# Patient Record
Sex: FEMALE | Race: BLACK OR AFRICAN AMERICAN | NOT HISPANIC OR LATINO
[De-identification: names, ages, dates, MRNs, and addresses within clinical notes are randomized per-mention and may not be internally consistent; named-entity substitution may affect disease eponyms.]

---

## 2018-09-25 ENCOUNTER — APPOINTMENT (OUTPATIENT)
Dept: VASCULAR SURGERY | Facility: CLINIC | Age: 83
End: 2018-09-25

## 2019-09-10 ENCOUNTER — APPOINTMENT (OUTPATIENT)
Dept: WOUND CARE | Facility: CLINIC | Age: 84
End: 2019-09-10
Payer: COMMERCIAL

## 2019-09-10 VITALS
BODY MASS INDEX: 36.64 KG/M2 | WEIGHT: 228 LBS | SYSTOLIC BLOOD PRESSURE: 105 MMHG | HEIGHT: 66 IN | DIASTOLIC BLOOD PRESSURE: 78 MMHG | TEMPERATURE: 97.5 F | HEART RATE: 60 BPM

## 2019-09-10 DIAGNOSIS — M54.9 DORSALGIA, UNSPECIFIED: ICD-10-CM

## 2019-09-10 DIAGNOSIS — Z91.81 HISTORY OF FALLING: ICD-10-CM

## 2019-09-10 PROCEDURE — 99204 OFFICE O/P NEW MOD 45 MIN: CPT

## 2019-09-10 RX ORDER — SODIUM HYPOCHLORITE 1.25 MG/ML
0.12 SOLUTION TOPICAL
Qty: 1 | Refills: 3 | Status: ACTIVE | COMMUNITY
Start: 2019-09-10 | End: 1900-01-01

## 2019-09-10 RX ORDER — COLLAGENASE SANTYL 250 [ARB'U]/G
250 OINTMENT TOPICAL DAILY
Qty: 1 | Refills: 3 | Status: ACTIVE | COMMUNITY
Start: 2019-09-10 | End: 1900-01-01

## 2019-09-10 NOTE — REASON FOR VISIT
[Consultation] : a consultation visit [Family Member] : family member [FreeTextEntry1] : sacral ulcer

## 2019-09-10 NOTE — REVIEW OF SYSTEMS
[As Noted in HPI] : as noted in HPI [Skin Wound] : skin wound [Negative] : Neurological [FreeTextEntry3] : drops for glaucoma [FreeTextEntry8] : frequency

## 2019-09-10 NOTE — ASSESSMENT
[FreeTextEntry1] : 86 yr. old with pressure ulcer sacral stage 3, and left ischial stage 2,H/O DM HTN HLD PVD lymphedema, atrial sep anerysm, CKD stage 3, lumbar stenosis, alopecia, candidiasis (not current)\par  pt. ambulates with walker, wears depends undergarments, incont. at times.\par   datacomplexity- moderate, lab, xr, old rec, test resultsreviewed, no image\par risk-mod for surgery, on asa tolerated sharp debridement well, minimal bleeding x 2 wounds\par risk for fall\par Plan  - clean with dakins, apply santyl to moistened dakins gauze, apply to both areas, cover\par Instructed to avoid depends undergarment, wear underwear with sanitary liner instead to avoid urine against skin,\par Nutrition-  counselled to increase protein calories/DM monitor sugar, fup with hga1c\par Offload- will order offloading mattress and roho cushion, instructed in high protein diet.\par Dakins and santyl ordered from pharmacy, supplies ordered from [facility]\par Declined nursing services, daughter is a PA and will do wound care \par

## 2019-09-10 NOTE — PHYSICAL EXAM
[Normal Breath Sounds] : Normal breath sounds [Normal Heart Sounds] : normal heart sounds [Skin Ulcer] : ulcer [Alert] : alert [Oriented to Person] : oriented to person [Oriented to Place] : oriented to place [Oriented to Time] : oriented to time [Calm] : calm [JVD] : no jugular venous distention  [Normal Rate and Rhythm] : normal rate and rhythm [2+] : left 2+ [Ankle Swelling (On Exam)] : not present [Abdomen Tenderness] : ~T ~M No abdominal tenderness [de-identified] : NAD [de-identified] : frequency [de-identified] : uses walker to ambulate [FreeTextEntry1] : buttock [FreeTextEntry2] : 1.9 [FreeTextEntry3] : 1.8 [FreeTextEntry4] : 0.2 [FreeTextEntry5] : # 15 scalpel [de-identified] : skin and subcutaneous 1 mm deeper [de-identified] : dakins/santyl [FreeTextEntry7] : midline sacral [FreeTextEntry8] : 3 [FreeTextEntry9] : 1.6 [de-identified] : 1.2 [FreeTextEntry6] : # 15 scalpel [de-identified] : skin and subcutaneous 1 mm deeper [de-identified] : dakins/santyl [TWNoteComboBox1] : Left [TWNoteComboBox2] : 2 [TWNoteComboBox6] : Pressure [de-identified] : 2.5% Lidocaine Topical [TWNoteComboBox7] : Kristina [de-identified] : Debridement performed of all devitalized tissue to bleeding viable tissue [de-identified] : 3 [de-identified] : Pressure [de-identified] : 2.5% Lidocaine Topical [TWNoteComboBox8] : Kristina [de-identified] : Debridement performed of all devitalized tissue to bleeding viable tissue

## 2019-10-04 ENCOUNTER — APPOINTMENT (OUTPATIENT)
Dept: WOUND CARE | Facility: CLINIC | Age: 84
End: 2019-10-04

## 2019-11-14 ENCOUNTER — APPOINTMENT (OUTPATIENT)
Dept: WOUND CARE | Facility: CLINIC | Age: 84
End: 2019-11-14
Payer: COMMERCIAL

## 2019-11-14 DIAGNOSIS — Z87.39 PERSONAL HISTORY OF OTHER DISEASES OF THE MUSCULOSKELETAL SYSTEM AND CONNECTIVE TISSUE: ICD-10-CM

## 2019-11-14 PROCEDURE — 11042 DBRDMT SUBQ TIS 1ST 20SQCM/<: CPT

## 2019-11-14 NOTE — ASSESSMENT
[FreeTextEntry1] : 86 yr. old with pressure ulcer sacral stage 3, and left ischial stage 2,H/O DM HTN HLD PVD lymphedema, atrial sep anerysm, CKD stage 3, lumbar stenosis, alopecia, candidiasis (not current)\par  pt. ambulates with walker, wears depends undergarments, incont. at times.\par   datacomplexity- moderate, lab, xr, old rec, test resultsreviewed, no image\par risk-mod for surgery, on asa tolerated sharp debridement well, minimal bleeding x 2 wounds\par risk for fall\par will order vac 125\par on mvi- reviewed nutrition\par Plan  - clean with dakins, apply santyl to moistened dakins gauze, apply to both areas, cover\par Instructed to avoid depends undergarment, wear underwear with sanitary liner instead to avoid urine against skin,\par Nutrition-  counselled to increase protein calories/DM monitor sugar, fup with hga1c\par Offload- will order offloading mattress and roho cushion, instructed in high protein diet.\par Dakins and santyl ordered from pharmacy, supplies ordered from [facility]\par Declined nursing services, daughter is a PA and will do wound care \par \par Wound measurement has larger and vac is suggested three times \par

## 2019-11-14 NOTE — PHYSICAL EXAM
[JVD] : no jugular venous distention  [Normal Breath Sounds] : Normal breath sounds [Normal Heart Sounds] : normal heart sounds [Normal Rate and Rhythm] : normal rate and rhythm [2+] : left 2+ [Ankle Swelling (On Exam)] : not present [Abdomen Tenderness] : ~T ~M No abdominal tenderness [Skin Ulcer] : ulcer [Alert] : alert [Oriented to Person] : oriented to person [Oriented to Place] : oriented to place [Oriented to Time] : oriented to time [Calm] : calm [de-identified] : NAD [de-identified] : frequency [de-identified] : uses walker to ambulate [FreeTextEntry1] : buttock [FreeTextEntry2] : .1 [FreeTextEntry3] : .1 [FreeTextEntry4] : 0.2 [de-identified] : skin and subcutaneous 1 mm deeper [FreeTextEntry5] : # 15 scalpel [de-identified] : woo [de-identified] : 1.9/1.8/0.2 [FreeTextEntry7] : midline sacral [FreeTextEntry8] : 4.2cm [FreeTextEntry9] : 2cm [de-identified] : 1.6 [FreeTextEntry6] : # 3 hilaryette [de-identified] : skin and subcutaneous 1 mm deeper [de-identified] : off loading [de-identified] : dakins/santyl [de-identified] : previous3/1.6/1.2 [TWNoteComboBox1] : Left [TWNoteComboBox6] : Pressure [TWNoteComboBox2] : 2 [TWNoteComboBox7] : Kristina [de-identified] : Debridement performed of all devitalized tissue to bleeding viable tissue [de-identified] : 3 [de-identified] : Small [de-identified] : No [de-identified] : Pressure [de-identified] : No [de-identified] : Macerated [de-identified] : None [de-identified] : None [de-identified] : 100% [TWNoteComboBox8] : Kristina [de-identified] : Yes [de-identified] : Debridement performed of all devitalized tissue to bleeding viable tissue [de-identified] : Other [de-identified] : Primary Dressing

## 2019-11-14 NOTE — HISTORY OF PRESENT ILLNESS
[FreeTextEntry1] : Ms. CARMELLA NAILS   presents to the office with a wound for few months duration.  The wound is located on  the sacral  and buttock .  The patient has complaints of burning,pain, \par    The patient has been dressing the wound with silvadene. \par  The patient denies fevers or chills.  \par The patient has localized pain to the wound upon dressing changes. \par  The patient has no other complaints or associated symptoms.  HbA1c is 6.5\par H/O DM HTN HLD PVD lymphedema, atrial sep anerysm, CKD stage 3, lumbar stenosis, alopecia, candidiasis\par

## 2019-12-12 ENCOUNTER — APPOINTMENT (OUTPATIENT)
Dept: WOUND CARE | Facility: CLINIC | Age: 84
End: 2019-12-12
Payer: COMMERCIAL

## 2019-12-12 DIAGNOSIS — Z86.79 PERSONAL HISTORY OF OTHER DISEASES OF THE CIRCULATORY SYSTEM: ICD-10-CM

## 2019-12-12 PROCEDURE — 11043 DBRDMT MUSC&/FSCA 1ST 20/<: CPT

## 2019-12-12 PROCEDURE — 99213 OFFICE O/P EST LOW 20 MIN: CPT | Mod: 25

## 2019-12-12 NOTE — PHYSICAL EXAM
[Normal Breath Sounds] : Normal breath sounds [Normal Rate and Rhythm] : normal rate and rhythm [Normal Heart Sounds] : normal heart sounds [2+] : left 2+ [Skin Ulcer] : ulcer [Alert] : alert [Oriented to Person] : oriented to person [Oriented to Place] : oriented to place [Oriented to Time] : oriented to time [Calm] : calm [4 x 4] : 4 x 4  [JVD] : no jugular venous distention  [Abdomen Tenderness] : ~T ~M No abdominal tenderness [Ankle Swelling (On Exam)] : not present [de-identified] : NAD [de-identified] : frequency [de-identified] : uses walker to ambulate [FreeTextEntry1] : buttock [FreeTextEntry2] : .1 [FreeTextEntry3] : .1 [FreeTextEntry4] : 0.2 [FreeTextEntry5] : # 15 scalpel [de-identified] : skin and subcutaneous 1 mm deeper [de-identified] : woo [de-identified] : 1.9/1.8/0.2 [FreeTextEntry7] : midline sacral [FreeTextEntry8] : 4 cm [FreeTextEntry9] : 2.2 cm [de-identified] : @ 6 oclock 1.5cm  [de-identified] : 1cm [FreeTextEntry6] : # 3 hilaryette [de-identified] : skin and subcutaneous 1 mm deeper [de-identified] : off loading [de-identified] : dakins/santyl [de-identified] : previous\par 4.2/2/1.6 [TWNoteComboBox1] : Left [TWNoteComboBox2] : 2 [TWNoteComboBox7] : Kristina [TWNoteComboBox6] : Pressure [de-identified] : Debridement performed of all devitalized tissue to bleeding viable tissue [de-identified] : 3 [de-identified] : Small [de-identified] : No [de-identified] : Pressure [de-identified] : No [de-identified] : None [de-identified] : Macerated [de-identified] : None [de-identified] : Yes [de-identified] : 100% [TWNoteComboBox8] : Kristina [de-identified] : Other [de-identified] : Debridement performed of all devitalized tissue to bleeding viable tissue

## 2019-12-12 NOTE — ASSESSMENT
[FreeTextEntry1] : 86 yr. old with pressure ulcer sacral stage 3, and left ischial stage 2,H/O DM HTN HLD PVD lymphedema, atrial sep anerysm, CKD stage 3, lumbar stenosis, alopecia, candidiasis (not current)\par \par trying to move more, pt. ambulates with walker, wears depends undergarments, incont. at times.\par   datacomplexity- moderate, lab, xr, old rec, test resultsreviewed, no image\par risk-mod for surgery, on asa tolerated sharp debridement well, minimal bleeding  \par risk for fall\par refusing vac 125- dc, options reviewed patient and family\par on mvi- reviewed nutrition\par Plan  - clean with dakins, apply santyl to moistened dakins gauze, apply to both areas, cover\par Instructed to avoid depends undergarment, wear underwear with sanitary liner instead to avoid urine against skin,\par Nutrition-  counselled to increase protein calories/DM monitor sugar, fup with hga1c\par \par Offload- will order offloading mattress and roho cushion, instructed in high protein diet.\par Dakins and santyl ordered from pharmacy, supplies ordered from [facility]\par Declined nursing services, daughter is a PA and will do wound care \par \par  discussed prontosan option

## 2019-12-12 NOTE — HISTORY OF PRESENT ILLNESS
[FreeTextEntry1] : Ms. CARMELLA NAILS   presents to the office with a wound for few months duration. \par had vac started last  month- refuses to use, will not change to a lower pressure, reviewed with patient and family , currently using dakins and foam with santyl\par \par  The wound is located on  the sacral  and buttock .  The patient has complaints of burning,pain, \par    The patient has been dressing the wound with silvadene. \par  The patient denies fevers or chills.  \par The patient has localized pain to the wound upon dressing changes. \par  The patient has no other complaints or associated symptoms.  HbA1c is 6.5\par H/O DM HTN HLD PVD lymphedema, atrial sep anerysm, CKD stage 3, lumbar stenosis, alopecia, candidiasis\par

## 2020-01-16 ENCOUNTER — APPOINTMENT (OUTPATIENT)
Dept: WOUND CARE | Facility: CLINIC | Age: 85
End: 2020-01-16
Payer: COMMERCIAL

## 2020-01-16 PROCEDURE — 99213 OFFICE O/P EST LOW 20 MIN: CPT | Mod: 25

## 2020-01-16 PROCEDURE — 11042 DBRDMT SUBQ TIS 1ST 20SQCM/<: CPT

## 2020-02-06 ENCOUNTER — INPATIENT (INPATIENT)
Facility: HOSPITAL | Age: 85
LOS: 0 days | Discharge: HOME | End: 2020-02-07
Attending: HOSPITALIST | Admitting: HOSPITALIST
Payer: COMMERCIAL

## 2020-02-06 VITALS
TEMPERATURE: 98 F | RESPIRATION RATE: 16 BRPM | SYSTOLIC BLOOD PRESSURE: 137 MMHG | HEART RATE: 62 BPM | DIASTOLIC BLOOD PRESSURE: 32 MMHG | OXYGEN SATURATION: 100 %

## 2020-02-06 DIAGNOSIS — K92.1 MELENA: ICD-10-CM

## 2020-02-06 DIAGNOSIS — Z79.899 OTHER LONG TERM (CURRENT) DRUG THERAPY: ICD-10-CM

## 2020-02-06 DIAGNOSIS — E78.5 HYPERLIPIDEMIA, UNSPECIFIED: ICD-10-CM

## 2020-02-06 DIAGNOSIS — I10 ESSENTIAL (PRIMARY) HYPERTENSION: ICD-10-CM

## 2020-02-06 DIAGNOSIS — K92.2 GASTROINTESTINAL HEMORRHAGE, UNSPECIFIED: ICD-10-CM

## 2020-02-06 DIAGNOSIS — E11.9 TYPE 2 DIABETES MELLITUS WITHOUT COMPLICATIONS: ICD-10-CM

## 2020-02-06 DIAGNOSIS — Z02.9 ENCOUNTER FOR ADMINISTRATIVE EXAMINATIONS, UNSPECIFIED: ICD-10-CM

## 2020-02-06 DIAGNOSIS — F03.90 UNSPECIFIED DEMENTIA WITHOUT BEHAVIORAL DISTURBANCE: ICD-10-CM

## 2020-02-06 DIAGNOSIS — D50.0 IRON DEFICIENCY ANEMIA SECONDARY TO BLOOD LOSS (CHRONIC): ICD-10-CM

## 2020-02-06 DIAGNOSIS — L98.429 NON-PRESSURE CHRONIC ULCER OF BACK WITH UNSPECIFIED SEVERITY: ICD-10-CM

## 2020-02-06 DIAGNOSIS — Z29.9 ENCOUNTER FOR PROPHYLACTIC MEASURES, UNSPECIFIED: ICD-10-CM

## 2020-02-06 LAB
ALBUMIN SERPL ELPH-MCNC: 3.7 G/DL — SIGNIFICANT CHANGE UP (ref 3.3–5)
ALP SERPL-CCNC: 44 U/L — SIGNIFICANT CHANGE UP (ref 40–120)
ALT FLD-CCNC: 19 U/L — SIGNIFICANT CHANGE UP (ref 4–33)
ANION GAP SERPL CALC-SCNC: 11 MMO/L — SIGNIFICANT CHANGE UP (ref 7–14)
APTT BLD: 29.5 SEC — SIGNIFICANT CHANGE UP (ref 27.5–36.3)
AST SERPL-CCNC: 43 U/L — HIGH (ref 4–32)
BASOPHILS # BLD AUTO: 0.1 K/UL — SIGNIFICANT CHANGE UP (ref 0–0.2)
BASOPHILS NFR BLD AUTO: 1.4 % — SIGNIFICANT CHANGE UP (ref 0–2)
BILIRUB SERPL-MCNC: 0.5 MG/DL — SIGNIFICANT CHANGE UP (ref 0.2–1.2)
BLD GP AB SCN SERPL QL: NEGATIVE — SIGNIFICANT CHANGE UP
BUN SERPL-MCNC: 25 MG/DL — HIGH (ref 7–23)
CALCIUM SERPL-MCNC: 10.1 MG/DL — SIGNIFICANT CHANGE UP (ref 8.4–10.5)
CHLORIDE SERPL-SCNC: 105 MMOL/L — SIGNIFICANT CHANGE UP (ref 98–107)
CO2 SERPL-SCNC: 24 MMOL/L — SIGNIFICANT CHANGE UP (ref 22–31)
CREAT SERPL-MCNC: 0.92 MG/DL — SIGNIFICANT CHANGE UP (ref 0.5–1.3)
EOSINOPHIL # BLD AUTO: 0.16 K/UL — SIGNIFICANT CHANGE UP (ref 0–0.5)
EOSINOPHIL NFR BLD AUTO: 2.2 % — SIGNIFICANT CHANGE UP (ref 0–6)
GLUCOSE SERPL-MCNC: 138 MG/DL — HIGH (ref 70–99)
HCT VFR BLD CALC: 32.4 % — LOW (ref 34.5–45)
HCT VFR BLD CALC: 34.4 % — LOW (ref 34.5–45)
HGB BLD-MCNC: 10.1 G/DL — LOW (ref 11.5–15.5)
HGB BLD-MCNC: 11 G/DL — LOW (ref 11.5–15.5)
IMM GRANULOCYTES NFR BLD AUTO: 0.4 % — SIGNIFICANT CHANGE UP (ref 0–1.5)
INR BLD: 1.03 — SIGNIFICANT CHANGE UP (ref 0.88–1.17)
LYMPHOCYTES # BLD AUTO: 1.81 K/UL — SIGNIFICANT CHANGE UP (ref 1–3.3)
LYMPHOCYTES # BLD AUTO: 24.5 % — SIGNIFICANT CHANGE UP (ref 13–44)
MAGNESIUM SERPL-MCNC: 1.4 MG/DL — LOW (ref 1.6–2.6)
MCHC RBC-ENTMCNC: 31.2 % — LOW (ref 32–36)
MCHC RBC-ENTMCNC: 32 % — SIGNIFICANT CHANGE UP (ref 32–36)
MCHC RBC-ENTMCNC: 32.1 PG — SIGNIFICANT CHANGE UP (ref 27–34)
MCHC RBC-ENTMCNC: 32.6 PG — SIGNIFICANT CHANGE UP (ref 27–34)
MCV RBC AUTO: 102.1 FL — HIGH (ref 80–100)
MCV RBC AUTO: 102.9 FL — HIGH (ref 80–100)
MONOCYTES # BLD AUTO: 0.49 K/UL — SIGNIFICANT CHANGE UP (ref 0–0.9)
MONOCYTES NFR BLD AUTO: 6.6 % — SIGNIFICANT CHANGE UP (ref 2–14)
NEUTROPHILS # BLD AUTO: 4.8 K/UL — SIGNIFICANT CHANGE UP (ref 1.8–7.4)
NEUTROPHILS NFR BLD AUTO: 64.9 % — SIGNIFICANT CHANGE UP (ref 43–77)
NRBC # FLD: 0 K/UL — SIGNIFICANT CHANGE UP (ref 0–0)
NRBC # FLD: 0 K/UL — SIGNIFICANT CHANGE UP (ref 0–0)
OB PNL STL: POSITIVE — SIGNIFICANT CHANGE UP
PHOSPHATE SERPL-MCNC: 3 MG/DL — SIGNIFICANT CHANGE UP (ref 2.5–4.5)
PLATELET # BLD AUTO: 277 K/UL — SIGNIFICANT CHANGE UP (ref 150–400)
PLATELET # BLD AUTO: 282 K/UL — SIGNIFICANT CHANGE UP (ref 150–400)
PMV BLD: 11.1 FL — SIGNIFICANT CHANGE UP (ref 7–13)
PMV BLD: 11.2 FL — SIGNIFICANT CHANGE UP (ref 7–13)
POTASSIUM SERPL-MCNC: 5.3 MMOL/L — SIGNIFICANT CHANGE UP (ref 3.5–5.3)
POTASSIUM SERPL-SCNC: 5.3 MMOL/L — SIGNIFICANT CHANGE UP (ref 3.5–5.3)
PROT SERPL-MCNC: 7.1 G/DL — SIGNIFICANT CHANGE UP (ref 6–8.3)
PROTHROM AB SERPL-ACNC: 11.5 SEC — SIGNIFICANT CHANGE UP (ref 9.8–13.1)
RBC # BLD: 3.15 M/UL — LOW (ref 3.8–5.2)
RBC # BLD: 3.37 M/UL — LOW (ref 3.8–5.2)
RBC # FLD: 11.4 % — SIGNIFICANT CHANGE UP (ref 10.3–14.5)
RBC # FLD: 11.6 % — SIGNIFICANT CHANGE UP (ref 10.3–14.5)
RH IG SCN BLD-IMP: POSITIVE — SIGNIFICANT CHANGE UP
SODIUM SERPL-SCNC: 140 MMOL/L — SIGNIFICANT CHANGE UP (ref 135–145)
WBC # BLD: 7.39 K/UL — SIGNIFICANT CHANGE UP (ref 3.8–10.5)
WBC # BLD: 8.24 K/UL — SIGNIFICANT CHANGE UP (ref 3.8–10.5)
WBC # FLD AUTO: 7.39 K/UL — SIGNIFICANT CHANGE UP (ref 3.8–10.5)
WBC # FLD AUTO: 8.24 K/UL — SIGNIFICANT CHANGE UP (ref 3.8–10.5)

## 2020-02-06 PROCEDURE — 99222 1ST HOSP IP/OBS MODERATE 55: CPT

## 2020-02-06 RX ORDER — INFLUENZA VIRUS VACCINE 15; 15; 15; 15 UG/.5ML; UG/.5ML; UG/.5ML; UG/.5ML
0.5 SUSPENSION INTRAMUSCULAR ONCE
Refills: 0 | Status: DISCONTINUED | OUTPATIENT
Start: 2020-02-06 | End: 2020-02-07

## 2020-02-06 RX ORDER — INSULIN LISPRO 100/ML
VIAL (ML) SUBCUTANEOUS
Refills: 0 | Status: DISCONTINUED | OUTPATIENT
Start: 2020-02-06 | End: 2020-02-07

## 2020-02-06 RX ORDER — INSULIN LISPRO 100/ML
VIAL (ML) SUBCUTANEOUS AT BEDTIME
Refills: 0 | Status: DISCONTINUED | OUTPATIENT
Start: 2020-02-06 | End: 2020-02-07

## 2020-02-06 RX ORDER — MAGNESIUM SULFATE 500 MG/ML
1 VIAL (ML) INJECTION ONCE
Refills: 0 | Status: COMPLETED | OUTPATIENT
Start: 2020-02-06 | End: 2020-02-06

## 2020-02-06 RX ORDER — DEXTROSE 50 % IN WATER 50 %
25 SYRINGE (ML) INTRAVENOUS ONCE
Refills: 0 | Status: DISCONTINUED | OUTPATIENT
Start: 2020-02-06 | End: 2020-02-07

## 2020-02-06 RX ORDER — GLUCAGON INJECTION, SOLUTION 0.5 MG/.1ML
1 INJECTION, SOLUTION SUBCUTANEOUS ONCE
Refills: 0 | Status: DISCONTINUED | OUTPATIENT
Start: 2020-02-06 | End: 2020-02-07

## 2020-02-06 RX ORDER — ATENOLOL 25 MG/1
1 TABLET ORAL
Qty: 0 | Refills: 0 | DISCHARGE

## 2020-02-06 RX ORDER — ATENOLOL 25 MG/1
25 TABLET ORAL DAILY
Refills: 0 | Status: DISCONTINUED | OUTPATIENT
Start: 2020-02-06 | End: 2020-02-06

## 2020-02-06 RX ORDER — LATANOPROST 0.05 MG/ML
1 SOLUTION/ DROPS OPHTHALMIC; TOPICAL AT BEDTIME
Refills: 0 | Status: DISCONTINUED | OUTPATIENT
Start: 2020-02-06 | End: 2020-02-07

## 2020-02-06 RX ORDER — SIMVASTATIN 20 MG/1
20 TABLET, FILM COATED ORAL AT BEDTIME
Refills: 0 | Status: DISCONTINUED | OUTPATIENT
Start: 2020-02-06 | End: 2020-02-07

## 2020-02-06 RX ORDER — ATENOLOL 25 MG/1
25 TABLET ORAL DAILY
Refills: 0 | Status: DISCONTINUED | OUTPATIENT
Start: 2020-02-06 | End: 2020-02-07

## 2020-02-06 RX ORDER — DEXTROSE 50 % IN WATER 50 %
12.5 SYRINGE (ML) INTRAVENOUS ONCE
Refills: 0 | Status: DISCONTINUED | OUTPATIENT
Start: 2020-02-06 | End: 2020-02-07

## 2020-02-06 RX ORDER — PANTOPRAZOLE SODIUM 20 MG/1
40 TABLET, DELAYED RELEASE ORAL EVERY 12 HOURS
Refills: 0 | Status: DISCONTINUED | OUTPATIENT
Start: 2020-02-06 | End: 2020-02-07

## 2020-02-06 RX ORDER — LANOLIN ALCOHOL/MO/W.PET/CERES
3 CREAM (GRAM) TOPICAL AT BEDTIME
Refills: 0 | Status: DISCONTINUED | OUTPATIENT
Start: 2020-02-06 | End: 2020-02-07

## 2020-02-06 RX ORDER — DEXTROSE 50 % IN WATER 50 %
15 SYRINGE (ML) INTRAVENOUS ONCE
Refills: 0 | Status: DISCONTINUED | OUTPATIENT
Start: 2020-02-06 | End: 2020-02-07

## 2020-02-06 RX ORDER — SODIUM CHLORIDE 9 MG/ML
1000 INJECTION, SOLUTION INTRAVENOUS
Refills: 0 | Status: DISCONTINUED | OUTPATIENT
Start: 2020-02-06 | End: 2020-02-07

## 2020-02-06 RX ADMIN — Medication 100 GRAM(S): at 23:01

## 2020-02-06 NOTE — H&P ADULT - PROBLEM SELECTOR PLAN 1
- ddx includes diverticulosis, hemorrhoids, polyp vs. malignancy, angioectasia  - hgb currently stable 10s-11s at baseline  - maintain active T&S although patient is Jehova's witness  - CBCs q12 for now given patient HD stable and cannot receive blood transfusion  - GI c/s in AM  - hold ASA and DVT PPx in setting of acute LGIB - ddx includes diverticulosis, hemorrhoids, polyp vs. malignancy, angioectasia  - hgb currently stable 10s-11s at baseline  - maintain active T&S although patient is Jehova's witness  - CBCs q12 for now given patient HD stable and cannot receive blood transfusion  - GI c/s in AM  - hold ASA and DVT PPx in setting of acute LGIB  - PPI IV q12 for now - ddx includes diverticulosis, hemorrhoids, polyp vs. malignancy, angioectasia  - hgb currently stable 10s-11s at baseline  - maintain active T&S, although patient is [employer] and likely not a candidate for blood products  - monitor CBC q12hrs for now given patient HD stable and cannot receive blood transfusion  - GI c/s in AM  - hold ASA and DVT PPx in setting of acute LGIB  - PPI IV q12hrs for now

## 2020-02-06 NOTE — H&P ADULT - PROBLEM SELECTOR PLAN 9
IMPROVE >2 however patient with active GIB  - DVT: SCDs  - Diet: DASH/TLC with CC, will give clear liquids for now  - Dispo: Pending further workup of GIB IMPROVE >2, however patient with active GIB  - DVT: SCDs  - Diet: DASH/TLC with CC, will give clear liquids for now  - Dispo: Pending further workup of GIB

## 2020-02-06 NOTE — ED PROVIDER NOTE - ATTENDING CONTRIBUTION TO CARE
Dr. Culver: 88 yo female with HTN, DM, HLD, in ED with concern for bleeding from rectum vs. from sacral decub that she sees wound care for.  No abdominal pain.  On exam pt chronically-ill appearing but in NAD, heart RRR, lungs CTAB, abd NTND, extremities without swelling, strength grossly decreased in all extremities, +sacral decubs noted but draining serosanguinous fluid, no signs of infection.

## 2020-02-06 NOTE — ED PROVIDER NOTE - PROGRESS NOTE DETAILS
Maxx: Repeat cbc dropped hb 11 to 10.1.  Discussed possibility of following up outpatient but given drop, will plan to admit. Maxx: Discussed admission with granddaughter Juli over the phone.  Patient wanted to discuss with granddaughter and became agitated after hearing about staying in hospital.  Patient appears to be altered and cannot be redirected.  Granddaughter en route.

## 2020-02-06 NOTE — H&P ADULT - PROBLEM SELECTOR PLAN 3
- FS premeal and qHS  - AISS PRN  - last a1c 5.4 2016 - hgb 10s-11s at baseline  - maintain active T&S although patient is a Jehovah's witness - hgb 10s-11s at baseline  - maintain active T&S although patient is a Latter day  - macrocytic anemia will check B12 and folate in AM  - iron + TIBC + ferritin in AM - hgb 10s-11s at baseline  - maintain active T&S, although patient is Anglican and likely not a candidate for blood products  - macrocytic anemia will check B12 and folate in AM  - iron + TIBC + ferritin in AM for possible iron infusion instead

## 2020-02-06 NOTE — H&P ADULT - NSHPPHYSICALEXAM_GEN_ALL_CORE
PHYSICAL EXAM:    Vital Signs Last 24 Hrs  T(C): 37.2 (06 Feb 2020 22:26), Max: 37.2 (06 Feb 2020 22:26)  T(F): 98.9 (06 Feb 2020 22:26), Max: 98.9 (06 Feb 2020 22:26)  HR: 65 (06 Feb 2020 22:26) (58 - 65)  BP: 147/55 (06 Feb 2020 22:26) (137/32 - 147/55)  BP(mean): --  RR: 18 (06 Feb 2020 22:26) (12 - 18)  SpO2: 100% (06 Feb 2020 22:26) (100% - 100%)    General: No acute distress.  HEENT: NCAT.  PERRL.  EOMI.  No scleral icterus or injection.  Moist MM.  No oropharyngeal exudates.    Neck: Supple.  Full ROM.  No JVD.  No thyromegaly. No lymphadenopathy.   Heart: RRR.  Normal S1 and S2.  No murmurs, rubs, or gallops.   Lungs: CTAB. No wheezes, crackles, or rhonchi.    Abdomen: BS+, soft, NT/ND.  No organomegaly.  Skin: Stage III sacral ulcer with clean edges, pink tissue with some red tissue but no active bleeding  Extremities: venous stasis changes bilaterally  Musculoskeletal: No deformities.  No spinal or paraspinal tenderness.  Neuro: A&Ox0.  Patient says "here we go again" or "I don't know" to all examiner's questions  Rectal: hard, brown stool in rectal vault, no blood PHYSICAL EXAM:    Vital Signs Last 24 Hrs  T(C): 37.2 (06 Feb 2020 22:26), Max: 37.2 (06 Feb 2020 22:26)  T(F): 98.9 (06 Feb 2020 22:26), Max: 98.9 (06 Feb 2020 22:26)  HR: 65 (06 Feb 2020 22:26) (58 - 65)  BP: 147/55 (06 Feb 2020 22:26) (137/32 - 147/55)  BP(mean): --  RR: 18 (06 Feb 2020 22:26) (12 - 18)  SpO2: 100% (06 Feb 2020 22:26) (100% - 100%)    General: Awake and alert. No acute distress.  Head: NCAT.    Eyes: PERRL.  EOMI.  No scleral icterus or injection.    Mouth: Moist MM.  No oropharyngeal exudates.    Neck: Supple.  Full ROM.  No JVD.  No thyromegaly. No lymphadenopathy.   Heart: RRR.  Normal S1 and S2.  No murmurs, rubs, or gallops.  No pitting LE edema b/l.  Lungs: Nonlabored breathing.  CTAB.  No wheezes, crackles, or rhonchi.    Abdomen: BS+, soft, NT/ND.    Skin: Stage III sacral ulcer with clean edges, pink tissue with some red tissue but no active bleeding  Extremities: 2+ peripheral pulses b/l. Venous stasis changes in LE b/l.  Musculoskeletal: No deformities.  No spinal or paraspinal tenderness.  Neuro: A&Ox0.  Patient says "here we go again" or "I don't know" to all examiner's questions.  Rectal: Hard, brown stool in rectal vault, no blood

## 2020-02-06 NOTE — ED ADULT NURSE REASSESSMENT NOTE - NS ED NURSE REASSESS COMMENT FT1
Pt refusing VS and all care. Granddaughter is not at bedside. Pt states is A&Ox1, states "I've figured you guys out, you can't do anything to me without my granddaughter here." Dr. Smiley at bedside and aware.

## 2020-02-06 NOTE — H&P ADULT - PROBLEM SELECTOR PLAN 10
- Dispo: Pending further workup of GIB  - PCP is Dr. Biswas  - Per granddaughter patient is currently FULL CODE

## 2020-02-06 NOTE — ED ADULT NURSE REASSESSMENT NOTE - NS ED NURSE REASSESS COMMENT FT1
Pt refusing to allow staff to take her VS. Pt states "Leave my alone, I'm not going to die here." Pt calling for her granddaughter, "Juli, Juli they're not going to trick me into staying." Maxx DO called pt's granddaughter to let her know about the situation. Pt is laying down in bed.

## 2020-02-06 NOTE — ED PROVIDER NOTE - OBJECTIVE STATEMENT
88yo female with pmh dm, htn, hld presenting with rectal bleeding.  Patient has noticed 3 bright red bloody bowel movements over the past week.  States she is unsure if she is bleeding from rectum or known stage 3 decubitus ulcer she follows with wound care for.  Denies lightheadedness, syncope, cp, sob.  No abdominal pain.  No blood thinners. 86yo female with pmh dm, htn, hld presenting with rectal bleeding.  Patient has noticed 3 bright red bloody bowel movements over the past week.  States she is unsure if she is bleeding from rectum or known stage 3 decubitus ulcer she follows with wound care for.  Denies lightheadedness, syncope, cp, sob.  No abdominal pain.  No blood thinners.  Patient is a Amish and refuses blood transfusions

## 2020-02-06 NOTE — H&P ADULT - NSICDXPASTSURGICALHX_GEN_ALL_CORE_FT
PAST SURGICAL HISTORY:  H/O benign breast biopsy 1993    Humeral shaft fracture ORIF with hardware  ORIF revision    No significant past surgical history

## 2020-02-06 NOTE — H&P ADULT - ATTENDING COMMENTS
86 yo woman, Buddhism, with history of dementia, DM2, HTN, HLD, LE venous insufficiency, chronic sacral ulcer p/w possible BRBPR. Pt currently with H/H at baseline and HD stable. Will monitor H/H q12hrs and obtain GI consult.

## 2020-02-06 NOTE — H&P ADULT - PROBLEM SELECTOR PLAN 8
- Dispo: Pending further workup of GIB  - PCP is Dr. Biswas - patient and granddaughter cannot recall exact meds  - full med rec in AM

## 2020-02-06 NOTE — H&P ADULT - PROBLEM SELECTOR PLAN 2
- hgb 10s-11s at baseline  - maintain active T&S although patient is a Buddhism - currently appears clean with no active bleeding  - wound care c/s in AM

## 2020-02-06 NOTE — H&P ADULT - HISTORY OF PRESENT ILLNESS
88yo female with pmh dm, htn, hld presenting with rectal bleeding.  Patient has noticed 3 bright red bloody bowel movements over the past week.  States she is unsure if she is bleeding from rectum or known stage 3 decubitus ulcer she follows with wound care for.  Denies lightheadedness, syncope, cp, sob. No abdominal pain.     Patient is a Faith and refuses blood transfusions.    In the ED:  - vital signs Tm 98.6, HR 58-62, -146/32-65, RR 12-16, O2 100  - Labs: hgb 11 (BL 9-12, .1), PLT 280s, BUN 25, Cr. 0.92, BG 130s 88yo female Anglican with pmh dm, htn, hld, PVD, chronic sacral ulcer presenting with BRBPR x 1 week. Patient has noticed 3 bright red bloody bowel movements over the past week.  States she is unsure if she is bleeding from rectum or known stage 3 decubitus ulcer she follows with wound care for.  Denies lightheadedness, syncope, cp, sob. No abdominal pain.     Patient is a Jew and refuses blood transfusions.    In the ED:  - vital signs Tm 98.6, HR 58-62, -146/32-65, RR 12-16, O2 100  - Labs: hgb 11 (BL 9-12, .1), PLT 280s, BUN 25, Cr. 0.92, BG 130s 86yo female Taoism with pmh dementia, dm, htn, hld, LE venous insufficiency, chronic sacral ulcer presenting with intermittent bright red blood found in her sheets x 1 week. Patient is a poor historian so history obtained from patient and patient's granddaughter and HCP Juli [phone]. The patient's granddaughter noticed 3 episodes of bright red blood on her sheets approximately 5cm in diameter. THe patient's granddaughter is unsure whether the blood came from the patient's rectum or from her chronic Stage III sacral ulcer. The patient reports noticing BRBPR in stools, filling the toilet bowl, intermittently for the past week. She denies abdominal pain, N/V. She received a sigmoidoscopy many years ago; she reports the results were WNL. She never received a colonoscopy or endoscopy. For her sacral wound, she follows with Dr. Palomares who reports the sacral ulcer has been improving over time. The patient and daughter deny fever, n/v, lightheadedness, abdominal pain,syncope, cp, sob. She has chronic LE venous stasis changes.      In the ED:  - vital signs Tm 98.6, HR 58-62, -146/32-65, RR 12-16, O2 100  - Labs: hgb 11 (BL 9-12, .1), PLT 280s, BUN 25, Cr. 0.92, BG 130s, FS 83, FOBT +

## 2020-02-06 NOTE — ED PROVIDER NOTE - PHYSICAL EXAMINATION
General appearance: NAD, conversant, afebrile    Neck: Trachea midline; Full range of motion, supple   Pulm: normal respiratory effort and no intercostal retractions, normal work of breathing   Abdomen: Soft, non-tender, non-distended; no masses or hepatosplenomegaly.   Extremities: No peripheral edema    Skin: Dry, normal temperature, turgor and texture; no rash   Psych: Appropriate affect, cooperative   Rectal: No gross blood, external or internal hemorrhoids, no lesions.  Clean stage 3 sacral decubitus ulcer. Chaperone Portillo Mathis MD

## 2020-02-06 NOTE — H&P ADULT - NSICDXPASTMEDICALHX_GEN_ALL_CORE_FT
PAST MEDICAL HISTORY:  Compound fracture right Humerus    DM (diabetes mellitus)     Edema     Essential hypertension     Glaucoma     HTN (hypertension)     Hyperlipidemia     Hyperlipidemia, unspecified hyperlipidemia type     Left bundle branch block     Neoplasm of maxilla     Obesity     PVD (peripheral vascular disease)     Sciatica     Spinal stenosis     Spinal stenosis of lumbar region     Venous stasis of lower extremity

## 2020-02-06 NOTE — ED ADULT NURSE NOTE - OBJECTIVE STATEMENT
Pt received to room 6. Pt comes to ED c/o rectal bleeding for "a few days." Pt reports seeing "bright red blood" in her toilet and on the floor when it "drips after using the bathroom." No hx of hemorrhoids, no anti-coagulation use, endorses taking Aspirin daily. Pt has hx of spinal stenosis, is wheelchair bound. Pt has stage 3 pressure ulcer 3.5x5cm. As per granddaughter at bedside, gets wound care tx. Pt's respirations are even & unlabored, denies chest pain, dyspnea, N/V/D, chills, fever, weakness, dizziness, headache, palpitation, cough. 22 G IV placed to right forearm. Pt is A&Ox4. Pt provided with pillows and blankets. Pt received to room 6. Pt comes to ED c/o rectal bleeding for "a few days." Pt reports seeing "bright red blood" in her toilet and on the floor when it "drips after using the bathroom." No hx of hemorrhoids, no anti-coagulation use, endorses taking Aspirin daily. Pt has hx of spinal stenosis, is wheelchair bound. Pt has stage 3 pressure ulcer 3.5x5cm to right buttock. As per granddaughter at bedside, gets wound care tx. Pt's respirations are even & unlabored, denies chest pain, dyspnea, N/V/D, chills, fever, weakness, dizziness, headache, palpitation, cough. 22 G IV placed to right forearm. Pt is A&Ox4. Pt provided with pillows and blankets.

## 2020-02-06 NOTE — ED PROVIDER NOTE - CLINICAL SUMMARY MEDICAL DECISION MAKING FREE TEXT BOX
88yo female with pmh dm, htn, hld presenting with rectal bleeding.  Will check labs, type.  No gross blood on exam and no identifiable source of bleed.  Hemodynamically stable but will resuscitate if needed.

## 2020-02-06 NOTE — H&P ADULT - PROBLEM SELECTOR PLAN 7
IMPROVE >2 however patient with active GIB  - DVT: SCDs  - Diet: DASH/TLC with CC, will give clear liquids for now  - Dispo: Pending further workup of GIB - c/w statin

## 2020-02-06 NOTE — ED ADULT NURSE NOTE - NSIMPLEMENTINTERV_GEN_ALL_ED
Implemented All Fall with Harm Risk Interventions:  [place] to call system. Call bell, personal items and telephone within reach. Instruct patient to call for assistance. Room bathroom lighting operational. Non-slip footwear when patient is off stretcher. Physically safe environment: no spills, clutter or unnecessary equipment. Stretcher in lowest position, wheels locked, appropriate side rails in place. Provide visual cue, wrist band, yellow gown, etc. Monitor gait and stability. Monitor for mental status changes and reorient to person, place, and time. Review medications for side effects contributing to fall risk. Reinforce activity limits and safety measures with patient and family. Provide visual clues: red socks.

## 2020-02-06 NOTE — ED PROVIDER NOTE - PSH
H/O benign breast biopsy  1993  Humeral shaft fracture  ORIF with hardware  ORIF revision  No significant past surgical history

## 2020-02-06 NOTE — H&P ADULT - ASSESSMENT
88yo female [employer] with pmh dm, htn, hld, PVD, chronic sacral ulcer presenting with BRBPR x 1 week, hgb stable at baseline in 10s-11s, presentation c/f hematochezia 2/2 LGIB, ddx includes diverticulosis, hemorrhoids, polyp vs. malignancy, angioectasia, etc VS UGIB given elevated BUN/Cr ratio. 88yo female [employer]oration[employer] with pmh dm, htn, hld, PVD, chronic sacral ulcer presenting with blood on bedsheets x 1 week also c/o BRBPR, hgb stable at baseline in 10s-11s, presentation c/f hematochezia 2/2 LGIB, ddx includes diverticulosis, hemorrhoids, polyp vs. malignancy, angioectasia, constipation etc VS UGIB given elevated BUN/Cr ratio VS. bleeding from stage III sacral ulcer. 88yo female Jew with pmh dementia, dm, htn, hld, LE venous insufficiency, chronic sacral ulcer, presenting with blood on bedsheets x 1 week also c/o BRBPR, hgb stable at baseline in 10s-11s with +FOBT and elevated BUN/Cr ratio, presentation c/f hematochezia 2/2 LGIB, ddx includes diverticulosis, hemorrhoids, polyp vs. malignancy, angioectasia, constipation etc VS UGIB given elevated BUN/Cr ratio VS. bleeding from stage III sacral ulcer.

## 2020-02-06 NOTE — H&P ADULT - NSHPLABSRESULTS_GEN_ALL_CORE
CBC Full  -  ( 06 Feb 2020 16:03 )  WBC Count : 8.24 K/uL  Hemoglobin : 10.1 g/dL  Hematocrit : 32.4 %  Platelet Count - Automated : 282 K/uL  Mean Cell Volume : 102.9 fL  Mean Cell Hemoglobin : 32.1 pg  Mean Cell Hemoglobin Concentration : 31.2 %  Auto Neutrophil # : x  Auto Lymphocyte # : x  Auto Monocyte # : x  Auto Eosinophil # : x  Auto Basophil # : x  Auto Neutrophil % : x  Auto Lymphocyte % : x  Auto Monocyte % : x  Auto Eosinophil % : x  Auto Basophil % : x    02-06    140  |  105  |  25<H>  ----------------------------<  138<H>  5.3   |  24  |  0.92    Ca    10.1      06 Feb 2020 12:15  Phos  3.0     02-06  Mg     1.4     02-06    TPro  7.1  /  Alb  3.7  /  TBili  0.5  /  DBili  x   /  AST  43<H>  /  ALT  19  /  AlkPhos  44  02-06    LIVER FUNCTIONS - ( 06 Feb 2020 12:15 )  Alb: 3.7 g/dL / Pro: 7.1 g/dL / ALK PHOS: 44 u/L / ALT: 19 u/L / AST: 43 u/L / GGT: x             PT/INR - ( 06 Feb 2020 12:15 )   PT: 11.5 SEC;   INR: 1.03          PTT - ( 06 Feb 2020 12:15 )  PTT:29.5 SEC          EKG:       Imaging: CBC Full  -  ( 06 Feb 2020 16:03 )  WBC Count : 8.24 K/uL  Hemoglobin : 10.1 g/dL  Hematocrit : 32.4 %  Platelet Count - Automated : 282 K/uL  Mean Cell Volume : 102.9 fL  Mean Cell Hemoglobin : 32.1 pg  Mean Cell Hemoglobin Concentration : 31.2 %  Auto Neutrophil # : x  Auto Lymphocyte # : x  Auto Monocyte # : x  Auto Eosinophil # : x  Auto Basophil # : x  Auto Neutrophil % : x  Auto Lymphocyte % : x  Auto Monocyte % : x  Auto Eosinophil % : x  Auto Basophil % : x    02-06    140  |  105  |  25<H>  ----------------------------<  138<H>  5.3   |  24  |  0.92    Ca    10.1      06 Feb 2020 12:15  Phos  3.0     02-06  Mg     1.4     02-06    TPro  7.1  /  Alb  3.7  /  TBili  0.5  /  DBili  x   /  AST  43<H>  /  ALT  19  /  AlkPhos  44  02-06    LIVER FUNCTIONS - ( 06 Feb 2020 12:15 )  Alb: 3.7 g/dL / Pro: 7.1 g/dL / ALK PHOS: 44 u/L / ALT: 19 u/L / AST: 43 u/L / GGT: x             PT/INR - ( 06 Feb 2020 12:15 )   PT: 11.5 SEC;   INR: 1.03          PTT - ( 06 Feb 2020 12:15 )  PTT:29.5 SEC      EKG:       Imaging:

## 2020-02-06 NOTE — ED PROVIDER NOTE - PMH
Compound fracture  right Humerus  DM (diabetes mellitus)    Edema    Essential hypertension    Glaucoma    HTN (hypertension)    Hyperlipidemia    Hyperlipidemia, unspecified hyperlipidemia type    Left bundle branch block    Neoplasm of maxilla    Obesity    PVD (peripheral vascular disease)    Sciatica    Spinal stenosis    Spinal stenosis of lumbar region    Venous stasis of lower extremity

## 2020-02-06 NOTE — H&P ADULT - NSHPREVIEWOFSYSTEMS_GEN_ALL_CORE
REVIEW OF SYSTEMS:    CONSTITUTIONAL: No weakness, fevers or chills  EYES/ENT: No visual changes;  No vertigo or throat pain   NECK: No pain or stiffness  RESPIRATORY: No cough, wheezing, hemoptysis; No shortness of breath  CARDIOVASCULAR: No chest pain or palpitations  GASTROINTESTINAL: +hematochezia  GENITOURINARY: No dysuria, frequency or hematuria  NEUROLOGICAL: No numbness or weakness  SKIN: +stage III sacral ulcer Unable to obtain ROS given pt's dementia.

## 2020-02-06 NOTE — H&P ADULT - PROBLEM SELECTOR PROBLEM 4
HTN (hypertension) DM (diabetes mellitus) Type 2 diabetes mellitus without complication, without long-term current use of insulin

## 2020-02-06 NOTE — ED ADULT TRIAGE NOTE - CHIEF COMPLAINT QUOTE
Pt c/o intermittent  rectal bleed since Tuesday. Denies abd pain, dizziness, chest pain, sob, weakness

## 2020-02-06 NOTE — ED ADULT NURSE REASSESSMENT NOTE - NS ED NURSE REASSESS COMMENT FT1
Pt refusing to let staff give care. Refusing to allow staff members to obtain VS. Pt refusing to answer questions, she keeps saying "I want my granddaughter, you can't keep me here." Dr. Timmons aware. Awaiting for granddaughter to arrive.

## 2020-02-06 NOTE — H&P ADULT - NSICDXFAMILYHX_GEN_ALL_CORE_FT
FAMILY HISTORY:  Family history of Alzheimer's disease  Family history of pancreatic cancer  No pertinent family history in first degree relatives

## 2020-02-07 ENCOUNTER — TRANSCRIPTION ENCOUNTER (OUTPATIENT)
Age: 85
End: 2020-02-07

## 2020-02-07 VITALS
HEART RATE: 62 BPM | RESPIRATION RATE: 16 BRPM | OXYGEN SATURATION: 98 % | SYSTOLIC BLOOD PRESSURE: 122 MMHG | DIASTOLIC BLOOD PRESSURE: 54 MMHG | TEMPERATURE: 98 F

## 2020-02-07 DIAGNOSIS — E11.9 TYPE 2 DIABETES MELLITUS WITHOUT COMPLICATIONS: ICD-10-CM

## 2020-02-07 LAB
ALBUMIN SERPL ELPH-MCNC: 3.3 G/DL — SIGNIFICANT CHANGE UP (ref 3.3–5)
ALP SERPL-CCNC: 42 U/L — SIGNIFICANT CHANGE UP (ref 40–120)
ALT FLD-CCNC: 10 U/L — SIGNIFICANT CHANGE UP (ref 4–33)
ANION GAP SERPL CALC-SCNC: 12 MMO/L — SIGNIFICANT CHANGE UP (ref 7–14)
AST SERPL-CCNC: 20 U/L — SIGNIFICANT CHANGE UP (ref 4–32)
BASOPHILS # BLD AUTO: 0.08 K/UL — SIGNIFICANT CHANGE UP (ref 0–0.2)
BASOPHILS NFR BLD AUTO: 1.1 % — SIGNIFICANT CHANGE UP (ref 0–2)
BILIRUB SERPL-MCNC: 0.5 MG/DL — SIGNIFICANT CHANGE UP (ref 0.2–1.2)
BLD GP AB SCN SERPL QL: NEGATIVE — SIGNIFICANT CHANGE UP
BUN SERPL-MCNC: 19 MG/DL — SIGNIFICANT CHANGE UP (ref 7–23)
CALCIUM SERPL-MCNC: 9.7 MG/DL — SIGNIFICANT CHANGE UP (ref 8.4–10.5)
CHLORIDE SERPL-SCNC: 104 MMOL/L — SIGNIFICANT CHANGE UP (ref 98–107)
CO2 SERPL-SCNC: 23 MMOL/L — SIGNIFICANT CHANGE UP (ref 22–31)
CREAT SERPL-MCNC: 0.83 MG/DL — SIGNIFICANT CHANGE UP (ref 0.5–1.3)
EOSINOPHIL # BLD AUTO: 0.05 K/UL — SIGNIFICANT CHANGE UP (ref 0–0.5)
EOSINOPHIL NFR BLD AUTO: 0.7 % — SIGNIFICANT CHANGE UP (ref 0–6)
FERRITIN SERPL-MCNC: 250.9 NG/ML — HIGH (ref 15–150)
FOLATE SERPL-MCNC: > 20 NG/ML — HIGH (ref 4.7–20)
GLUCOSE BLDC GLUCOMTR-MCNC: 127 MG/DL — HIGH (ref 70–99)
GLUCOSE SERPL-MCNC: 108 MG/DL — HIGH (ref 70–99)
HBA1C BLD-MCNC: 4.9 % — SIGNIFICANT CHANGE UP (ref 4–5.6)
HCT VFR BLD CALC: 31.1 % — LOW (ref 34.5–45)
HGB BLD-MCNC: 10.1 G/DL — LOW (ref 11.5–15.5)
IMM GRANULOCYTES NFR BLD AUTO: 0.3 % — SIGNIFICANT CHANGE UP (ref 0–1.5)
IRON SATN MFR SERPL: 233 UG/DL — SIGNIFICANT CHANGE UP (ref 140–530)
IRON SATN MFR SERPL: 45 UG/DL — SIGNIFICANT CHANGE UP (ref 30–160)
LYMPHOCYTES # BLD AUTO: 2.4 K/UL — SIGNIFICANT CHANGE UP (ref 1–3.3)
LYMPHOCYTES # BLD AUTO: 33.2 % — SIGNIFICANT CHANGE UP (ref 13–44)
MAGNESIUM SERPL-MCNC: 1.6 MG/DL — SIGNIFICANT CHANGE UP (ref 1.6–2.6)
MCHC RBC-ENTMCNC: 32.3 PG — SIGNIFICANT CHANGE UP (ref 27–34)
MCHC RBC-ENTMCNC: 32.5 % — SIGNIFICANT CHANGE UP (ref 32–36)
MCV RBC AUTO: 99.4 FL — SIGNIFICANT CHANGE UP (ref 80–100)
MONOCYTES # BLD AUTO: 0.65 K/UL — SIGNIFICANT CHANGE UP (ref 0–0.9)
MONOCYTES NFR BLD AUTO: 9 % — SIGNIFICANT CHANGE UP (ref 2–14)
NEUTROPHILS # BLD AUTO: 4.02 K/UL — SIGNIFICANT CHANGE UP (ref 1.8–7.4)
NEUTROPHILS NFR BLD AUTO: 55.7 % — SIGNIFICANT CHANGE UP (ref 43–77)
NRBC # FLD: 0 K/UL — SIGNIFICANT CHANGE UP (ref 0–0)
PHOSPHATE SERPL-MCNC: 2.7 MG/DL — SIGNIFICANT CHANGE UP (ref 2.5–4.5)
PLATELET # BLD AUTO: 288 K/UL — SIGNIFICANT CHANGE UP (ref 150–400)
PMV BLD: 10.9 FL — SIGNIFICANT CHANGE UP (ref 7–13)
POTASSIUM SERPL-MCNC: 3.5 MMOL/L — SIGNIFICANT CHANGE UP (ref 3.5–5.3)
POTASSIUM SERPL-SCNC: 3.5 MMOL/L — SIGNIFICANT CHANGE UP (ref 3.5–5.3)
PROT SERPL-MCNC: 6.2 G/DL — SIGNIFICANT CHANGE UP (ref 6–8.3)
RBC # BLD: 3.13 M/UL — LOW (ref 3.8–5.2)
RBC # FLD: 11.5 % — SIGNIFICANT CHANGE UP (ref 10.3–14.5)
RH IG SCN BLD-IMP: POSITIVE — SIGNIFICANT CHANGE UP
SODIUM SERPL-SCNC: 139 MMOL/L — SIGNIFICANT CHANGE UP (ref 135–145)
UIBC SERPL-MCNC: 188 UG/DL — SIGNIFICANT CHANGE UP (ref 110–370)
VIT B12 SERPL-MCNC: 707 PG/ML — SIGNIFICANT CHANGE UP (ref 200–900)
WBC # BLD: 7.22 K/UL — SIGNIFICANT CHANGE UP (ref 3.8–10.5)
WBC # FLD AUTO: 7.22 K/UL — SIGNIFICANT CHANGE UP (ref 3.8–10.5)

## 2020-02-07 PROCEDURE — 99222 1ST HOSP IP/OBS MODERATE 55: CPT | Mod: GC

## 2020-02-07 PROCEDURE — 99239 HOSP IP/OBS DSCHRG MGMT >30: CPT

## 2020-02-07 RX ORDER — PANTOPRAZOLE SODIUM 20 MG/1
1 TABLET, DELAYED RELEASE ORAL
Qty: 20 | Refills: 0
Start: 2020-02-07 | End: 2020-02-16

## 2020-02-07 RX ADMIN — SIMVASTATIN 20 MILLIGRAM(S): 20 TABLET, FILM COATED ORAL at 00:02

## 2020-02-07 RX ADMIN — PANTOPRAZOLE SODIUM 40 MILLIGRAM(S): 20 TABLET, DELAYED RELEASE ORAL at 00:02

## 2020-02-07 RX ADMIN — ATENOLOL 25 MILLIGRAM(S): 25 TABLET ORAL at 05:20

## 2020-02-07 RX ADMIN — Medication 3 MILLIGRAM(S): at 00:02

## 2020-02-07 NOTE — PROGRESS NOTE ADULT - PROBLEM SELECTOR PLAN 4
- FS premeal and qHS  - AISS PRN  - last a1c 5.4 2016  - check a1c in AM - Hba1c is 4.9, will hold oral hypoglycemic agents on dc  - outpt f/u PCP with FS log  -FS premeal and qHS  - AISS PRN  - last a1c 5.4 2016

## 2020-02-07 NOTE — PROGRESS NOTE ADULT - PROBLEM SELECTOR PLAN 9
IMPROVE >2, however patient with active GIB  - DVT: SCDs  - Diet: DASH/TLC with CC, will give clear liquids for now  - Dispo: Pending further workup of GIB

## 2020-02-07 NOTE — CONSULT NOTE ADULT - ATTENDING COMMENTS
Patient requests outpatient follow up rather than inpatient colonoscopy, which was offered for Monday.   Discussed that if bleeding continues/worsens, will need to present again to ED for repeat evaluation.

## 2020-02-07 NOTE — PROGRESS NOTE ADULT - ASSESSMENT
86yo female Alev[employer] with pmh dementia, dm, htn, hld, LE venous insufficiency, chronic sacral ulcer, presenting with blood on bedsheets x 1 week also c/o BRBPR, hgb stable at baseline in 10s-11s with +FOBT and elevated BUN/Cr ratio, presentation c/f hematochezia 2/2 LGIB, ddx includes diverticulosis, hemorrhoids, polyp vs. malignancy, angioectasia, constipation etc VS UGIB given elevated BUN/Cr ratio VS. bleeding from stage III sacral ulcer.

## 2020-02-07 NOTE — DISCHARGE NOTE PROVIDER - CARE PROVIDERS DIRECT ADDRESSES
[email or web address]

## 2020-02-07 NOTE — PROGRESS NOTE ADULT - SUBJECTIVE AND OBJECTIVE BOX
[facility] Division of Hospital Medicine  Darwin Ford MD  Pager [phone]      Patient is a 87y old  Female who presents with a chief complaint of blood found on sheets (07 Feb 2020 10:02)      SUBJECTIVE / OVERNIGHT EVENTS: Pt states she wants to go home. No further bleed noted on sheets. Per granddaughter Juli ( a PA), she thinks the blood is from her ulcer not the rectum. She will like to take the pt home and follow up outpt with PCP and GI    MEDICATIONS  (STANDING):  ATENolol  Tablet 25 milliGRAM(s) Oral daily  dextrose 5%. 1000 milliLiter(s) (50 mL/Hr) IV Continuous <Continuous>  dextrose 50% Injectable 12.5 Gram(s) IV Push once  dextrose 50% Injectable 25 Gram(s) IV Push once  dextrose 50% Injectable 25 Gram(s) IV Push once  influenza   Vaccine 0.5 milliLiter(s) IntraMuscular once  insulin lispro (HumaLOG) corrective regimen sliding scale   SubCutaneous three times a day before meals  insulin lispro (HumaLOG) corrective regimen sliding scale   SubCutaneous at bedtime  latanoprost 0.005% Ophthalmic Solution 1 Drop(s) Both EYES at bedtime  melatonin 3 milliGRAM(s) Oral at bedtime  pantoprazole  Injectable 40 milliGRAM(s) IV Push every 12 hours  simvastatin 20 milliGRAM(s) Oral at bedtime    MEDICATIONS  (PRN):  dextrose 40% Gel 15 Gram(s) Oral once PRN Blood Glucose LESS THAN 70 milliGRAM(s)/deciliter  glucagon  Injectable 1 milliGRAM(s) IntraMuscular once PRN Glucose LESS THAN 70 milligrams/deciliter      CAPILLARY BLOOD GLUCOSE      POCT Blood Glucose.: 127 mg/dL (07 Feb 2020 10:57)  POCT Blood Glucose.: 107 mg/dL (07 Feb 2020 07:36)  POCT Blood Glucose.: 143 mg/dL (06 Feb 2020 23:00)  POCT Blood Glucose.: 83 mg/dL (06 Feb 2020 17:23)    I&O's Summary      PHYSICAL EXAM:  Vital Signs Last 24 Hrs  T(C): 36.9 (07 Feb 2020 11:25), Max: 37.2 (06 Feb 2020 22:26)  T(F): 98.4 (07 Feb 2020 11:25), Max: 98.9 (06 Feb 2020 22:26)  HR: 62 (07 Feb 2020 11:25) (58 - 65)  BP: 122/54 (07 Feb 2020 11:25) (102/53 - 147/55)  BP(mean): --  RR: 16 (07 Feb 2020 11:25) (12 - 18)  SpO2: 98% (07 Feb 2020 11:25) (98% - 100%)    CONSTITUTIONAL: NAD  EYES: PERRLA; conjunctiva and sclera clear  ENMT: mmm  NECK: Supple, no palpable masses; no thyromegaly  RESPIRATORY: Normal respiratory effort; lungs are clear to auscultation bilaterally  CARDIOVASCULAR: Regular rate and rhythm, + S1 and S2  ABDOMEN: Nontender to palpation, normoactive bowel sounds, no rebound/guarding  MUSCULOSKELETAL:  no clubbing or cyanosis of digits;   PSYCH: A+O x 3  Rectal: no blood in the vault,   SKIN: +sacral ulcer    LABS:                        10.1   7.22  )-----------( 288      ( 07 Feb 2020 04:35 )             31.1     02-07    139  |  104  |  19  ----------------------------<  108<H>  3.5   |  23  |  0.83    Ca    9.7      07 Feb 2020 04:35  Phos  2.7     02-07  Mg     1.6     02-07    TPro  6.2  /  Alb  3.3  /  TBili  0.5  /  DBili  x   /  AST  20  /  ALT  10  /  AlkPhos  42  02-07    PT/INR - ( 06 Feb 2020 12:15 )   PT: 11.5 SEC;   INR: 1.03          PTT - ( 06 Feb 2020 12:15 )  PTT:29.5 SEC              COORDINATION OF CARE:  Care Discussed with Consultants/Other Providers [Y/N]: Dr. Kerns ( GI)

## 2020-02-07 NOTE — DISCHARGE NOTE NURSING/CASE MANAGEMENT/SOCIAL WORK - PATIENT PORTAL LINK FT
You can access the FollowMyHealth Patient Portal offered by [facility] by registering at the following website: [email or web address]. By joining [facility]’s FollowMyHealth portal, you will also be able to view your health information using other applications (apps) compatible with our system.

## 2020-02-07 NOTE — DISCHARGE NOTE PROVIDER - CARE PROVIDER_API CALL
Sherry Biswas)  Internal Medicine  [address] [place] [address]  Phone: [phone]  Fax: [phone]  Scheduled Appointment: 02/10/2020 12:45 PM    Ivis Ann)  Surgery  [address] [place] [address], [address] [place] [address]  Phone: [phone]  Fax: [phone]  Follow Up Time:

## 2020-02-07 NOTE — DISCHARGE NOTE PROVIDER - NSDCCPCAREPLAN_GEN_ALL_CORE_FT
PRINCIPAL DISCHARGE DIAGNOSIS  Diagnosis: Bright red blood per rectum  Assessment and Plan of Treatment: Your blood count is stable. You can follow up with your outpatient primary care physician for endoscopy/colonoscopy evaluation. You have an appointment on 2/10 at 12:45pm.   Please take protonix 40 mg twice a day until your follow up. Prescription sent to [facility].   STOP taking aspirin until you are evaluated by your primary care physician.      SECONDARY DISCHARGE DIAGNOSES  Diagnosis: Dementia  Assessment and Plan of Treatment: Fall precautions.    Diagnosis: HTN (hypertension)  Assessment and Plan of Treatment: Continue your atenolol.    Diagnosis: Hyperlipidemia  Assessment and Plan of Treatment: Continue zocor.    Diagnosis: DM (diabetes mellitus)  Assessment and Plan of Treatment: Your hgba1c is 4.9. Your diabetic medications were held. Please check your blood sugar before meals and at bedtime. Keep a log book and bring this with you on your follow up with your primary care physician.    Diagnosis: Sacral ulcer  Assessment and Plan of Treatment: Continue wound care at home. Follow up with Dr Gomez, call to make an appointment.

## 2020-02-07 NOTE — DISCHARGE NOTE PROVIDER - NSDCMRMEDTOKEN_GEN_ALL_CORE_FT
ammonium lactate 12% topical lotion: 1 application topically 2 times a day  atenolol 25 mg oral tablet: 1 tab(s) orally once a day  bimatoprost 0.01% ophthalmic solution: 1 drop(s) to each affected eye once a day (at bedtime)  Protonix 40 mg oral delayed release tablet: 1 tab(s) orally 2 times a day   simvastatin 20 mg oral tablet: 1 tab(s) orally once a day (at bedtime)

## 2020-02-07 NOTE — CONSULT NOTE ADULT - SUBJECTIVE AND OBJECTIVE BOX
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     Patient is a poor historian so history obtained from notes. The patient's granddaughter noticed 3 episodes of bright red blood on her sheets. The patient reports noticing BRBPR in stools, filling the toilet bowl, intermittently for the past week. She denies nausea, vomiting, abdominal pain, loss of weight or appetite. She also denies lightheadedness, syncope, CP, SOB.     HCP Juli [phone]  She received a sigmoidoscopy many years ago; she reports the results were WNL. She never received a colonoscopy or endoscopy.     Allergies:  No Known Allergies      Home Medications:   Patient Currently Takes Medications as of 05-Apr-2016 09:46 documented in Structured Notes  · 	ammonium lactate 12% topical lotion: 1 application topically 2 times a day  · 	bimatoprost 0.01% ophthalmic solution: 1 drop(s) to each affected eye once a day (at bedtime)  · 	aspirin 81 mg oral tablet: 1 tab(s) orally once a day  · 	metFORMIN 750 mg oral tablet, extended release: 1 tab(s) orally once a day  · 	simvastatin 20 mg oral tablet: 1 tab(s) orally once a day (at bedtime)  · 	Byetta Prefilled Pen 5 mcg/0.02 mL subcutaneous solution: 5 microgram(s) subcutaneous 2 times a day  · 	atenolol 25 mg oral tablet: 1 tab(s) orally once a day    Hospital Medications:  ATENolol  Tablet 25 milliGRAM(s) Oral daily  dextrose 40% Gel 15 Gram(s) Oral once PRN  dextrose 5%. 1000 milliLiter(s) IV Continuous <Continuous>  dextrose 50% Injectable 12.5 Gram(s) IV Push once  dextrose 50% Injectable 25 Gram(s) IV Push once  dextrose 50% Injectable 25 Gram(s) IV Push once  glucagon  Injectable 1 milliGRAM(s) IntraMuscular once PRN  influenza   Vaccine 0.5 milliLiter(s) IntraMuscular once  insulin lispro (HumaLOG) corrective regimen sliding scale   SubCutaneous three times a day before meals  insulin lispro (HumaLOG) corrective regimen sliding scale   SubCutaneous at bedtime  latanoprost 0.005% Ophthalmic Solution 1 Drop(s) Both EYES at bedtime  melatonin 3 milliGRAM(s) Oral at bedtime  pantoprazole  Injectable 40 milliGRAM(s) IV Push every 12 hours  simvastatin 20 milliGRAM(s) Oral at bedtime      PMHX/PSHX:  Hyperlipidemia, unspecified hyperlipidemia type  Essential hypertension  Sciatica  Spinal stenosis  Left bundle branch block  Compound fracture  Neoplasm of maxilla  Glaucoma  Edema  Venous stasis of lower extremity  Obesity  Hyperlipidemia  Spinal stenosis of lumbar region  HTN (hypertension)  PVD (peripheral vascular disease)  DM (diabetes mellitus)  No significant past surgical history  H/O benign breast biopsy  Humeral shaft fracture      Family history:  No pertinent family history in first degree relatives  Family history of pancreatic cancer  Family history of Alzheimer's disease      Social History:   prior tobacco smoking history, quit many years ago  denies alcohol or illicit drug use    ROS:     General:  No wt loss, fevers, chills, night sweats, fatigue,   Eyes:  Good vision, no reported pain  ENT:  No sore throat, pain, runny nose, dysphagia  CV:  No pain, palpitations, hypo/hypertension  Resp:  No dyspnea, cough, tachypnea, wheezing  GI:  See HPI  :  No pain, bleeding, incontinence, nocturia  Muscle:  No pain, weakness  Neuro:  No weakness, tingling, memory problems  Psych:  No fatigue, insomnia, mood problems, depression  Endocrine:  No polyuria, polydipsia, cold/heat intolerance  Heme:  No petechiae, ecchymosis, easy bruisability  Skin:  No rash, edema      PHYSICAL EXAM:     GENERAL:  Appears stated age  HEENT:  NC/AT  CHEST:  Full & symmetric excursion  HEART:  Regular rhythm, S1, S2  ABDOMEN:  Soft, non-tender, non-distended  EXTREMITIES:  no edema  SKIN:  No rash  NEURO:  Alert, oriented    Vital Signs:  Vital Signs Last 24 Hrs  T(C): 37 (07 Feb 2020 05:16), Max: 37.2 (06 Feb 2020 22:26)  T(F): 98.6 (07 Feb 2020 05:16), Max: 98.9 (06 Feb 2020 22:26)  HR: 60 (07 Feb 2020 05:16) (58 - 65)  BP: 102/53 (07 Feb 2020 05:16) (102/53 - 147/55)  BP(mean): --  RR: 18 (07 Feb 2020 05:16) (12 - 18)  SpO2: 100% (07 Feb 2020 05:16) (100% - 100%)  Daily Height in cm: 165.1 (06 Feb 2020 22:26)    Daily     LABS:                        10.1   7.22  )-----------( 288      ( 07 Feb 2020 04:35 )             31.1     02-07    139  |  104  |  19  ----------------------------<  108<H>  3.5   |  23  |  0.83    Ca    9.7      07 Feb 2020 04:35  Phos  2.7     02-07  Mg     1.6     02-07    TPro  6.2  /  Alb  3.3  /  TBili  0.5  /  DBili  x   /  AST  20  /  ALT  10  /  AlkPhos  42  02-07    LIVER FUNCTIONS - ( 07 Feb 2020 04:35 )  Alb: 3.3 g/dL / Pro: 6.2 g/dL / ALK PHOS: 42 u/L / ALT: 10 u/L / AST: 20 u/L / GGT: x         PT/INR - ( 06 Feb 2020 12:15 )   PT: 11.5 SEC;   INR: 1.03     PTT - ( 06 Feb 2020 12:15 )  PTT:29.5 SEC      Imaging: Chief Complaint:  Patient is a 87y old  Female who presents with a chief complaint of blood found on sheets (06 Feb 2020 20:51)      HPI:  87 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     Patient is a poor historian so history obtained from notes. The patient's granddaughter noticed 3 episodes of bright red blood on her sheets. It is unclear if the blood was seen in the stool or if it is from a sacral decubitus ulcer. She denies nausea, vomiting, abdominal pain, loss of weight or appetite. She also denies lightheadedness, syncope, CP, SOB.     HCP Juli [phone]  She received a sigmoidoscopy many years ago; she reports the results were WNL. She never received a colonoscopy or endoscopy.     Allergies:  No Known Allergies      Home Medications:   Patient Currently Takes Medications as of 05-Apr-2016 09:46 documented in Structured Notes  · 	ammonium lactate 12% topical lotion: 1 application topically 2 times a day  · 	bimatoprost 0.01% ophthalmic solution: 1 drop(s) to each affected eye once a day (at bedtime)  · 	aspirin 81 mg oral tablet: 1 tab(s) orally once a day  · 	metFORMIN 750 mg oral tablet, extended release: 1 tab(s) orally once a day  · 	simvastatin 20 mg oral tablet: 1 tab(s) orally once a day (at bedtime)  · 	Byetta Prefilled Pen 5 mcg/0.02 mL subcutaneous solution: 5 microgram(s) subcutaneous 2 times a day  · 	atenolol 25 mg oral tablet: 1 tab(s) orally once a day    Hospital Medications:  ATENolol  Tablet 25 milliGRAM(s) Oral daily  dextrose 40% Gel 15 Gram(s) Oral once PRN  dextrose 5%. 1000 milliLiter(s) IV Continuous <Continuous>  dextrose 50% Injectable 12.5 Gram(s) IV Push once  dextrose 50% Injectable 25 Gram(s) IV Push once  dextrose 50% Injectable 25 Gram(s) IV Push once  glucagon  Injectable 1 milliGRAM(s) IntraMuscular once PRN  influenza   Vaccine 0.5 milliLiter(s) IntraMuscular once  insulin lispro (HumaLOG) corrective regimen sliding scale   SubCutaneous three times a day before meals  insulin lispro (HumaLOG) corrective regimen sliding scale   SubCutaneous at bedtime  latanoprost 0.005% Ophthalmic Solution 1 Drop(s) Both EYES at bedtime  melatonin 3 milliGRAM(s) Oral at bedtime  pantoprazole  Injectable 40 milliGRAM(s) IV Push every 12 hours  simvastatin 20 milliGRAM(s) Oral at bedtime      PMHX/PSHX:  Hyperlipidemia, unspecified hyperlipidemia type  Essential hypertension  Sciatica  Spinal stenosis  Left bundle branch block  Compound fracture  Neoplasm of maxilla  Glaucoma  Edema  Venous stasis of lower extremity  Obesity  Hyperlipidemia  Spinal stenosis of lumbar region  HTN (hypertension)  PVD (peripheral vascular disease)  DM (diabetes mellitus)  No significant past surgical history  H/O benign breast biopsy  Humeral shaft fracture      Family history:  No pertinent family history in first degree relatives  Family history of pancreatic cancer  Family history of Alzheimer's disease      Social History:   prior tobacco smoking history, quit many years ago  denies alcohol or illicit drug use    ROS:     General:  No wt loss, fevers, chills, night sweats, fatigue,   Eyes:  Good vision, no reported pain  ENT:  No sore throat, pain, runny nose, dysphagia  CV:  No pain, palpitations, hypo/hypertension  Resp:  No dyspnea, cough, tachypnea, wheezing  GI:  See HPI  :  No pain, bleeding, incontinence, nocturia  Muscle:  No pain, weakness  Neuro:  No weakness, tingling, memory problems  Psych:  No fatigue, insomnia, mood problems, depression  Endocrine:  No polyuria, polydipsia, cold/heat intolerance  Heme:  No petechiae, ecchymosis, easy bruisability  Skin:  No rash, edema      PHYSICAL EXAM:     GENERAL:  Appears stated age  HEENT:  NC/AT  CHEST:  Full & symmetric excursion  HEART:  Regular rhythm, S1, S2  ABDOMEN:  Soft, non-tender, non-distended  EXTREMITIES:  no edema  SKIN:  No rash  NEURO:  Alert, oriented    Vital Signs:  Vital Signs Last 24 Hrs  T(C): 37 (07 Feb 2020 05:16), Max: 37.2 (06 Feb 2020 22:26)  T(F): 98.6 (07 Feb 2020 05:16), Max: 98.9 (06 Feb 2020 22:26)  HR: 60 (07 Feb 2020 05:16) (58 - 65)  BP: 102/53 (07 Feb 2020 05:16) (102/53 - 147/55)  BP(mean): --  RR: 18 (07 Feb 2020 05:16) (12 - 18)  SpO2: 100% (07 Feb 2020 05:16) (100% - 100%)  Daily Height in cm: 165.1 (06 Feb 2020 22:26)    Daily     LABS:                        10.1   7.22  )-----------( 288      ( 07 Feb 2020 04:35 )             31.1     02-07    139  |  104  |  19  ----------------------------<  108<H>  3.5   |  23  |  0.83    Ca    9.7      07 Feb 2020 04:35  Phos  2.7     02-07  Mg     1.6     02-07    TPro  6.2  /  Alb  3.3  /  TBili  0.5  /  DBili  x   /  AST  20  /  ALT  10  /  AlkPhos  42  02-07    LIVER FUNCTIONS - ( 07 Feb 2020 04:35 )  Alb: 3.3 g/dL / Pro: 6.2 g/dL / ALK PHOS: 42 u/L / ALT: 10 u/L / AST: 20 u/L / GGT: x         PT/INR - ( 06 Feb 2020 12:15 )   PT: 11.5 SEC;   INR: 1.03     PTT - ( 06 Feb 2020 12:15 )  PTT:29.5 SEC      Imaging:

## 2020-02-07 NOTE — PHYSICAL THERAPY INITIAL EVALUATION ADULT - PERTINENT HX OF CURRENT PROBLEM, REHAB EVAL
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 malignancy, angioectasia, constipation etc VS UGIB given elevated BUN/Cr ratio VS. bleeding from stage III sacral ulcer.

## 2020-02-07 NOTE — PROGRESS NOTE ADULT - PROBLEM SELECTOR PLAN 1
- ddx includes diverticulosis, hemorrhoids, polyp vs. malignancy, angioectasia  - Discussed with GI, plan for scope on Monday 2/10, however, pt and fmaily wants to go home given the chance of deconditioning and worsening confusion with sundowning while waiting.   - hgb currently stable 10s-11s at baseline  - maintain active T&S, although patient is Catholic and likely not a candidate for blood products  - monitor CBC q12hrs for now given patient HD stable and cannot receive blood transfusion  - hold ASA and DVT PPx in setting of acute LGIB. Pt to follow up with PCP/GI outpt when to restart  - PPI IV q12hrs for now

## 2020-02-07 NOTE — PROGRESS NOTE ADULT - PROBLEM SELECTOR PLAN 10
Transitions of Care Status:  1.  Name of PCP: Dr. Sherry Biswas  2.  PCP Contacted on Admission: [ ] Y    [x ] N    3.  PCP contacted at Discharge: [ x] Y    [ ] N    [ ] N/A  4.  Post-Discharge Appointment Date and Location:  5.  Summary of Handoff given to PCP: Need for GI referral and when to start ASA Transitions of Care Status:  1.  Name of PCP: Dr. Sherry Biswas  2.  PCP Contacted on Admission: [ ] Y    [x ] N    3.  PCP contacted at Discharge: [ x] Y    [ ] N    [ ] N/A  4.  Post-Discharge Appointment Date and Location: 2/10/20 at 12:45PM  5.  Summary of Handoff given to PCP: Need for GI referral and when to start ASA as an outpt

## 2020-02-07 NOTE — PHYSICAL THERAPY INITIAL EVALUATION ADULT - ADDITIONAL COMMENTS
As per grand daughters, patient is always under supervision by family members and provides assistance as needed , requires assistance for transfers and stairs , once stood up, able to ambulate with SBA x 1 person with a rolling walker.

## 2020-02-07 NOTE — PHYSICAL THERAPY INITIAL EVALUATION ADULT - GENERAL OBSERVATIONS, REHAB EVAL
Patient received seated at the edge of bed , (+) arthritic changes in R hand /fingers, grand daughters are at bedside.

## 2020-02-07 NOTE — DISCHARGE NOTE PROVIDER - PROVIDER TOKENS
PROVIDER:[TOKEN:[[id number]],SCHEDULEDAPPT:[02/10/2020],SCHEDULEDAPPTTIME:[12:45 PM]],PROVIDER:[TOKEN:[[id number]]]

## 2020-02-07 NOTE — PROGRESS NOTE ADULT - PROBLEM SELECTOR PLAN 3
- hgb 10s-11s at baseline  - maintain active T&S, although patient is Zoroastrianism and likely not a candidate for blood products  - B12 and folate wnl  - iron deficiency ruled out

## 2020-02-07 NOTE — PHYSICAL THERAPY INITIAL EVALUATION ADULT - DISCHARGE DISPOSITION, PT EVAL
As per pt's grand daughter's pt is functioning at baseline as at home , hence refused Home PT services when recommended by PT.

## 2020-02-07 NOTE — CONSULT NOTE ADULT - ASSESSMENT
87 year old female Caodaism with dementia, HTN, HLD, DM type 2, LE venous insufficiency, chronic sacral ulcer presenting with intermittent bright red blood found in her sheets x 1 week.     IMPRESSION  - Anemia without iron deficiency, concern for hematochezia, patient refusing rectal exam  Ddx: UGI bleeding causes such as esophagitis, PUD, erosive gastritis, angioectasia, stomach cancer vs LGI bleeding causes such as diverticulosis, colon cancer     RECOMMENDATION    - trend CBC, CMP, INR  - plan for EGD+ colonoscopy on Monday, if family in agreement   - clear liquid diet on Sunday, NPO after midnight on Sunday  - Moviprep in PM  on Sunday (ordered by GI fellow)  - rest of care per primary team      Aldo Núñez, PGY-6  GI fellow  B- [id number]/ [phone]  Please call GI fellow on call after 5pm and on weekends 87 year old female Christianity with dementia, HTN, HLD, DM type 2, LE venous insufficiency, chronic sacral ulcer presenting with intermittent bright red blood found in her sheets x 1 week.     IMPRESSION  - Anemia without iron deficiency, concern for hematochezia, patient refusing rectal exam from me, reported rectal exam per primary team with no gross blood   Ddx: UGI bleeding causes such as esophagitis, PUD, erosive gastritis, angioectasia, stomach cancer vs LGI bleeding causes such as diverticulosis, colon cancer     RECOMMENDATION    - trend CBC, CMP, INR  - plan for EGD+ colonoscopy on Monday, if family in agreement   - clear liquid diet on Sunday, NPO after midnight on Sunday  - Moviprep in PM  on Sunday (ordered by GI fellow)  - rest of care per primary team  - if patient would like to proceed with outpatient evaluation, patient can follow up with GI as an outpatient on discharge ([facility]: [phone], [facility]: [phone], Attending Office: [phone])      Aldo Núñez, PGY-6  GI fellow  B- [id number]/ [phone]  Please call GI fellow on call after 5pm and on weekends

## 2020-02-07 NOTE — PROGRESS NOTE ADULT - PROBLEM SELECTOR PLAN 2
- currently appears clean with no active bleeding  - wound care c/s in AM  - Granddaughter refusing wound care, states she dresses the wound with dakins and santyl every 2 days at home and follows with Dr. Palomares. She plans to follow up with jason

## 2020-02-07 NOTE — PHYSICAL THERAPY INITIAL EVALUATION ADULT - ACTIVE RANGE OF MOTION EXAMINATION, REHAB EVAL
bilateral upper extremity Active ROM was WFL (within functional limits)/bilateral  lower extremity Active ROM was WFL (within functional limits)/except decreased closure of R hand/fingers

## 2020-02-07 NOTE — DISCHARGE NOTE PROVIDER - HOSPITAL COURSE
86yo female Religious with pmh dementia, dm, htn, hld, LE venous insufficiency, chronic sacral ulcer, presenting with blood on bedsheets x 1 week also c/o BRBPR, hgb stable at baseline in 10s-11s with +FOBT and elevated BUN/Cr ratio, presentation c/f hematochezia 2/2 LGIB, ddx includes diverticulosis, hemorrhoids, polyp vs. malignancy, angioectasia, constipation etc VS UGIB given elevated BUN/Cr ratio VS. bleeding from stage III sacral ulcer.        Hematochezia    - H&H stable    - hold ASA and DVT PPx in setting of acute LGIB while in hospital     - PPI IV q12 for now    - house GI cs: plan for EGD + colonoscopy     - family prefers to take patient home and f/u as outpatient  given the chance of deconditioning and worsening confusion with sundowning while waiting.     - hold ASA on d/c     - d/c on PPI PO BID until follow up with PCP on 2/10 at 12:45pm. Rx sent to [facility].         Sacral ulcer    - currently appears clean with no active bleeding    - wound care c/s - Granddaughter refusing wound care    - per family, wound is cleaned with dakins solution, applied santyl and covered with a special dressing. Wound care done every 2-3 days.     - f/u with Dr Palomares as outpatient        Anemia due to blood loss    - H&H stable    - B12 and folate WNL    - iron deficiency ruled out        DM    - sliding scale     - hgba1c 4.9        HTN, HLD    - atenolol, zocor        PT eval---    Medically cleared by Dr Ford for discharge.     d/c home 88yo female Zoroastrianism with pmh dementia, dm, htn, hld, LE venous insufficiency, chronic sacral ulcer, presenting with blood on bedsheets x 1 week also c/o BRBPR, hgb stable at baseline in 10s-11s with +FOBT and elevated BUN/Cr ratio, presentation c/f hematochezia 2/2 LGIB, ddx includes diverticulosis, hemorrhoids, polyp vs. malignancy, angioectasia, constipation etc VS UGIB given elevated BUN/Cr ratio VS. bleeding from stage III sacral ulcer.        Hematochezia    - H&H stable    - hold ASA and DVT PPx in setting of acute LGIB while in hospital     - PPI IV q12 for now    - house GI cs: plan for EGD + colonoscopy     - family prefers to take patient home and f/u as outpatient  given the chance of deconditioning and worsening confusion with sundowning while waiting.     - hold ASA on d/c     - d/c on PPI PO BID until follow up with PCP on 2/10 at 12:45pm. Rx sent to [facility].         Sacral ulcer    - currently appears clean with no active bleeding    - wound care c/s - Granddaughter refusing wound care    - per family, wound is cleaned with dakins solution, applied santyl and covered with a special dressing. Wound care done every 2-3 days.     - f/u with Dr Palomares as outpatient        Anemia due to blood loss    - H&H stable    - B12 and folate WNL    - iron deficiency ruled out        DM    - sliding scale while in hospital    - hgba1c 4.9    - d/c home meds on d/c and f/u with PCP         HTN, HLD    - atenolol, zocor        PT eval - no needs     Medically cleared by Dr Ford for discharge.     d/c home

## 2020-02-18 ENCOUNTER — APPOINTMENT (OUTPATIENT)
Dept: WOUND CARE | Facility: CLINIC | Age: 85
End: 2020-02-18
Payer: COMMERCIAL

## 2020-02-18 DIAGNOSIS — Z87.891 PERSONAL HISTORY OF NICOTINE DEPENDENCE: ICD-10-CM

## 2020-02-18 DIAGNOSIS — Z86.79 PERSONAL HISTORY OF OTHER DISEASES OF THE CIRCULATORY SYSTEM: ICD-10-CM

## 2020-02-18 PROCEDURE — 99213 OFFICE O/P EST LOW 20 MIN: CPT | Mod: 25

## 2020-02-18 PROCEDURE — 11043 DBRDMT MUSC&/FSCA 1ST 20/<: CPT

## 2020-02-18 RX ORDER — AMOXICILLIN AND CLAVULANATE POTASSIUM 875; 125 MG/1; MG/1
875-125 TABLET, COATED ORAL
Qty: 14 | Refills: 0 | Status: ACTIVE | COMMUNITY
Start: 2020-02-18 | End: 1900-01-01

## 2020-02-24 LAB — BACTERIA SPEC CULT: ABNORMAL

## 2020-02-24 NOTE — PHYSICAL EXAM
[Normal Breath Sounds] : Normal breath sounds [Normal Heart Sounds] : normal heart sounds [Normal Rate and Rhythm] : normal rate and rhythm [2+] : left 2+ [Skin Ulcer] : ulcer [Alert] : alert [Oriented to Person] : oriented to person [Oriented to Time] : oriented to time [Oriented to Place] : oriented to place [Calm] : calm [4 x 4] : 4 x 4  [JVD] : no jugular venous distention  [Ankle Swelling (On Exam)] : not present [de-identified] : NAD [Abdomen Tenderness] : ~T ~M No abdominal tenderness [de-identified] : frequency [de-identified] : uses walker to ambulate [FreeTextEntry1] : buttock [FreeTextEntry2] : 0 [FreeTextEntry3] : 0 [FreeTextEntry4] : 0 [de-identified] :  deeper [FreeTextEntry7] : midline sacral [de-identified] : ankle 33\par calf 34.5\par length 42 [de-identified] : woo [FreeTextEntry8] : 4.2 cm [FreeTextEntry9] : 1.8 cm [de-identified] : 2cm [de-identified] : @ 6 oclock 1.5cm  [FreeTextEntry6] : # 3 hilaryette [de-identified] : off loading [de-identified] : skin and subcutaneous 1 mm deeper [de-identified] : previous\par 4/2.2 [de-identified] : JEANTOSAMRA/HYDROFERA [TWNoteComboBox2] : 2 [TWNoteComboBox1] : Left [TWNoteComboBox6] : Pressure [TWNoteComboBox7] : False [de-identified] : False [de-identified] : Small [de-identified] : No [de-identified] : 3 [de-identified] : Pressure [de-identified] : Macerated [de-identified] : No [de-identified] : 100% [de-identified] : None [de-identified] : None [de-identified] : Yes [TWNoteComboBox8] : Kristina [de-identified] : Debridement performed of all devitalized tissue to bleeding viable tissue [de-identified] : Other [de-identified] : Primary Dressing

## 2020-02-24 NOTE — PLAN
[FreeTextEntry1] : 1/16/20\par Plan - clean with prontosan cleanser, gel on wound, hydrofera blue on top, cover with foam.\par supplies ordered, measured for compression stockings, ordered zippered compression stockings

## 2020-03-02 DIAGNOSIS — L98.429 NON-PRESSURE CHRONIC ULCER OF BACK WITH UNSPECIFIED SEVERITY: ICD-10-CM

## 2020-03-02 DIAGNOSIS — E11.9 TYPE 2 DIABETES MELLITUS W/OUT COMPLICATIONS: ICD-10-CM

## 2020-03-02 DIAGNOSIS — I10 ESSENTIAL (PRIMARY) HYPERTENSION: ICD-10-CM

## 2020-03-03 ENCOUNTER — APPOINTMENT (OUTPATIENT)
Dept: WOUND CARE | Facility: CLINIC | Age: 85
End: 2020-03-03

## 2020-03-13 NOTE — PHYSICAL EXAM
[Normal Breath Sounds] : Normal breath sounds [Normal Rate and Rhythm] : normal rate and rhythm [Normal Heart Sounds] : normal heart sounds [2+] : left 2+ [Skin Ulcer] : ulcer [Alert] : alert [Oriented to Person] : oriented to person [Oriented to Place] : oriented to place [Calm] : calm [Oriented to Time] : oriented to time [4 x 4] : 4 x 4  [JVD] : no jugular venous distention  [Ankle Swelling (On Exam)] : not present [Abdomen Tenderness] : ~T ~M No abdominal tenderness [de-identified] : NAD [de-identified] : frequency [de-identified] : uses walker to ambulate [FreeTextEntry1] : buttock [FreeTextEntry2] : 0 [FreeTextEntry3] : 0 [FreeTextEntry4] : 0 [de-identified] :  deeper [de-identified] : woo [de-identified] : ankle 33\par calf 34.5\par length 42 [FreeTextEntry7] :  sacral [FreeTextEntry8] : 5.5 [FreeTextEntry9] : 2.2 [de-identified] : 1.2 [de-identified] : 6 0'clock 2.5 cm/12 oclock 1.5 cm [FreeTextEntry6] : curette [de-identified] : skin and subcutaneous muscle [de-identified] : off loading [de-identified] : DAKINS [de-identified] : 4.2/1.8/2\par \par 10 o'clock 3 cm\par \par CULTURE OBTAINED [TWNoteComboBox1] : Left [TWNoteComboBox2] : 2 [TWNoteComboBox6] : Pressure [TWNoteComboBox9] : Midline [de-identified] : 3 [de-identified] : Small [de-identified] : No [de-identified] : Pressure [de-identified] : No [de-identified] : Macerated [de-identified] : None [de-identified] : None [de-identified] : 100% [de-identified] : Yes [TWNoteComboBox8] : Kristina [de-identified] : Debridement performed of all devitalized tissue to bleeding viable tissue [de-identified] : Other

## 2020-03-13 NOTE — ASSESSMENT
[FreeTextEntry1] : 86 yr. old with pressure ulcer sacral stage 3, and left ischial stage 2,H/O DM HTN HLD PVD lymphedema, atrial sep anerysm, CKD stage 3, lumbar stenosis, alopecia, candidiasis (not current)\par \par 2/18/20\par Wound is malodorous, draining moderate, blackened tissue noted, daughter changing every other day

## 2020-03-13 NOTE — PLAN
[FreeTextEntry1] : 2/18/20\par Plan - culture obtained, started on augmentin, supplies ordered, dakins & santyl dressing until prontosan arrives, then prontosan soak, gel, hydrofera blue \par follow up 3-4 weeks

## 2020-03-28 ENCOUNTER — INPATIENT (INPATIENT)
Facility: HOSPITAL | Age: 85
LOS: 0 days | Discharge: ANOTHER HOSPITAL | End: 2020-03-29
Attending: INTERNAL MEDICINE | Admitting: INTERNAL MEDICINE
Payer: COMMERCIAL

## 2020-03-28 VITALS
HEIGHT: 62 IN | SYSTOLIC BLOOD PRESSURE: 129 MMHG | DIASTOLIC BLOOD PRESSURE: 86 MMHG | HEART RATE: 57 BPM | RESPIRATION RATE: 21 BRPM | OXYGEN SATURATION: 93 %

## 2020-03-28 DIAGNOSIS — A41.9 SEPSIS, UNSPECIFIED ORGANISM: ICD-10-CM

## 2020-03-28 DIAGNOSIS — R50.9 FEVER, UNSPECIFIED: ICD-10-CM

## 2020-03-28 DIAGNOSIS — N17.9 ACUTE KIDNEY FAILURE, UNSPECIFIED: ICD-10-CM

## 2020-03-28 DIAGNOSIS — E11.9 TYPE 2 DIABETES MELLITUS WITHOUT COMPLICATIONS: ICD-10-CM

## 2020-03-28 LAB
ALBUMIN SERPL ELPH-MCNC: 1.9 G/DL — LOW (ref 3.3–5)
ALP SERPL-CCNC: 72 U/L — SIGNIFICANT CHANGE UP (ref 40–120)
ALT FLD-CCNC: 55 U/L — SIGNIFICANT CHANGE UP (ref 12–78)
ANION GAP SERPL CALC-SCNC: 11 MMOL/L — SIGNIFICANT CHANGE UP (ref 5–17)
AST SERPL-CCNC: 33 U/L — SIGNIFICANT CHANGE UP (ref 15–37)
BASOPHILS # BLD AUTO: 0 K/UL — SIGNIFICANT CHANGE UP (ref 0–0.2)
BASOPHILS NFR BLD AUTO: 0 % — SIGNIFICANT CHANGE UP (ref 0–2)
BILIRUB SERPL-MCNC: 0.6 MG/DL — SIGNIFICANT CHANGE UP (ref 0.2–1.2)
BUN SERPL-MCNC: 99 MG/DL — HIGH (ref 7–23)
CALCIUM SERPL-MCNC: 9 MG/DL — SIGNIFICANT CHANGE UP (ref 8.5–10.1)
CHLORIDE SERPL-SCNC: 106 MMOL/L — SIGNIFICANT CHANGE UP (ref 96–108)
CO2 SERPL-SCNC: 19 MMOL/L — LOW (ref 22–31)
CREAT SERPL-MCNC: 1.86 MG/DL — HIGH (ref 0.5–1.3)
EOSINOPHIL # BLD AUTO: 0 K/UL — SIGNIFICANT CHANGE UP (ref 0–0.5)
EOSINOPHIL NFR BLD AUTO: 0 % — SIGNIFICANT CHANGE UP (ref 0–6)
GLUCOSE BLDC GLUCOMTR-MCNC: 404 MG/DL — HIGH (ref 70–99)
GLUCOSE BLDC GLUCOMTR-MCNC: 417 MG/DL — HIGH (ref 70–99)
GLUCOSE BLDC GLUCOMTR-MCNC: 493 MG/DL — CRITICAL HIGH (ref 70–99)
GLUCOSE BLDC GLUCOMTR-MCNC: 496 MG/DL — CRITICAL HIGH (ref 70–99)
GLUCOSE BLDC GLUCOMTR-MCNC: 520 MG/DL — CRITICAL HIGH (ref 70–99)
GLUCOSE SERPL-MCNC: 482 MG/DL — CRITICAL HIGH (ref 70–99)
HCT VFR BLD CALC: 36.9 % — SIGNIFICANT CHANGE UP (ref 34.5–45)
HGB BLD-MCNC: 11.7 G/DL — SIGNIFICANT CHANGE UP (ref 11.5–15.5)
LYMPHOCYTES # BLD AUTO: 0 % — LOW (ref 13–44)
LYMPHOCYTES # BLD AUTO: 0 K/UL — LOW (ref 1–3.3)
MACROCYTES BLD QL: SIGNIFICANT CHANGE UP
MANUAL SMEAR VERIFICATION: SIGNIFICANT CHANGE UP
MCHC RBC-ENTMCNC: 31.6 PG — SIGNIFICANT CHANGE UP (ref 27–34)
MCHC RBC-ENTMCNC: 31.7 GM/DL — LOW (ref 32–36)
MCV RBC AUTO: 99.7 FL — SIGNIFICANT CHANGE UP (ref 80–100)
MONOCYTES # BLD AUTO: 0 K/UL — SIGNIFICANT CHANGE UP (ref 0–0.9)
MONOCYTES NFR BLD AUTO: 0 % — LOW (ref 2–14)
NEUTROPHILS # BLD AUTO: 31.83 K/UL — HIGH (ref 1.8–7.4)
NEUTROPHILS NFR BLD AUTO: 99 % — HIGH (ref 43–77)
NEUTS BAND # BLD: 1 % — SIGNIFICANT CHANGE UP (ref 0–8)
NRBC # BLD: 0 /100 — SIGNIFICANT CHANGE UP (ref 0–0)
NRBC # BLD: SIGNIFICANT CHANGE UP /100 WBCS (ref 0–0)
PLAT MORPH BLD: NORMAL — SIGNIFICANT CHANGE UP
PLATELET # BLD AUTO: 199 K/UL — SIGNIFICANT CHANGE UP (ref 150–400)
POTASSIUM SERPL-MCNC: 4.5 MMOL/L — SIGNIFICANT CHANGE UP (ref 3.5–5.3)
POTASSIUM SERPL-SCNC: 4.5 MMOL/L — SIGNIFICANT CHANGE UP (ref 3.5–5.3)
PROT SERPL-MCNC: 6 GM/DL — SIGNIFICANT CHANGE UP (ref 6–8.3)
RBC # BLD: 3.7 M/UL — LOW (ref 3.8–5.2)
RBC # FLD: 13.4 % — SIGNIFICANT CHANGE UP (ref 10.3–14.5)
RBC BLD AUTO: NORMAL — SIGNIFICANT CHANGE UP
SODIUM SERPL-SCNC: 136 MMOL/L — SIGNIFICANT CHANGE UP (ref 135–145)
TROPONIN I SERPL-MCNC: 0.03 NG/ML — SIGNIFICANT CHANGE UP (ref 0.01–0.04)
WBC # BLD: 31.83 K/UL — HIGH (ref 3.8–10.5)
WBC # FLD AUTO: 31.83 K/UL — HIGH (ref 3.8–10.5)

## 2020-03-28 PROCEDURE — 99284 EMERGENCY DEPT VISIT MOD MDM: CPT

## 2020-03-28 PROCEDURE — 71045 X-RAY EXAM CHEST 1 VIEW: CPT | Mod: 26

## 2020-03-28 RX ORDER — DEXTROSE 50 % IN WATER 50 %
25 SYRINGE (ML) INTRAVENOUS ONCE
Refills: 0 | Status: DISCONTINUED | OUTPATIENT
Start: 2020-03-28 | End: 2020-03-29

## 2020-03-28 RX ORDER — SODIUM CHLORIDE 9 MG/ML
1000 INJECTION INTRAMUSCULAR; INTRAVENOUS; SUBCUTANEOUS ONCE
Refills: 0 | Status: COMPLETED | OUTPATIENT
Start: 2020-03-28 | End: 2020-03-28

## 2020-03-28 RX ORDER — SODIUM CHLORIDE 9 MG/ML
1000 INJECTION INTRAMUSCULAR; INTRAVENOUS; SUBCUTANEOUS
Refills: 0 | Status: DISCONTINUED | OUTPATIENT
Start: 2020-03-28 | End: 2020-03-29

## 2020-03-28 RX ORDER — INSULIN LISPRO 100/ML
VIAL (ML) SUBCUTANEOUS
Refills: 0 | Status: DISCONTINUED | OUTPATIENT
Start: 2020-03-28 | End: 2020-03-29

## 2020-03-28 RX ORDER — DEXTROSE 50 % IN WATER 50 %
12.5 SYRINGE (ML) INTRAVENOUS ONCE
Refills: 0 | Status: DISCONTINUED | OUTPATIENT
Start: 2020-03-28 | End: 2020-03-29

## 2020-03-28 RX ORDER — INSULIN HUMAN 100 [IU]/ML
6 INJECTION, SOLUTION SUBCUTANEOUS ONCE
Refills: 0 | Status: COMPLETED | OUTPATIENT
Start: 2020-03-28 | End: 2020-03-28

## 2020-03-28 RX ORDER — GLUCAGON INJECTION, SOLUTION 0.5 MG/.1ML
1 INJECTION, SOLUTION SUBCUTANEOUS ONCE
Refills: 0 | Status: DISCONTINUED | OUTPATIENT
Start: 2020-03-28 | End: 2020-03-29

## 2020-03-28 RX ORDER — INSULIN HUMAN 100 [IU]/ML
4 INJECTION, SOLUTION SUBCUTANEOUS ONCE
Refills: 0 | Status: COMPLETED | OUTPATIENT
Start: 2020-03-28 | End: 2020-03-28

## 2020-03-28 RX ORDER — SODIUM CHLORIDE 9 MG/ML
1000 INJECTION, SOLUTION INTRAVENOUS
Refills: 0 | Status: DISCONTINUED | OUTPATIENT
Start: 2020-03-28 | End: 2020-03-29

## 2020-03-28 RX ORDER — INSULIN GLARGINE 100 [IU]/ML
25 INJECTION, SOLUTION SUBCUTANEOUS AT BEDTIME
Refills: 0 | Status: DISCONTINUED | OUTPATIENT
Start: 2020-03-28 | End: 2020-03-29

## 2020-03-28 RX ORDER — PIPERACILLIN AND TAZOBACTAM 4; .5 G/20ML; G/20ML
3.38 INJECTION, POWDER, LYOPHILIZED, FOR SOLUTION INTRAVENOUS ONCE
Refills: 0 | Status: COMPLETED | OUTPATIENT
Start: 2020-03-28 | End: 2020-03-28

## 2020-03-28 RX ORDER — PIPERACILLIN AND TAZOBACTAM 4; .5 G/20ML; G/20ML
3.38 INJECTION, POWDER, LYOPHILIZED, FOR SOLUTION INTRAVENOUS EVERY 8 HOURS
Refills: 0 | Status: DISCONTINUED | OUTPATIENT
Start: 2020-03-28 | End: 2020-03-29

## 2020-03-28 RX ORDER — DEXTROSE 50 % IN WATER 50 %
15 SYRINGE (ML) INTRAVENOUS ONCE
Refills: 0 | Status: DISCONTINUED | OUTPATIENT
Start: 2020-03-28 | End: 2020-03-29

## 2020-03-28 RX ORDER — VANCOMYCIN HCL 1 G
1000 VIAL (EA) INTRAVENOUS ONCE
Refills: 0 | Status: COMPLETED | OUTPATIENT
Start: 2020-03-28 | End: 2020-03-28

## 2020-03-28 RX ADMIN — INSULIN HUMAN 6 UNIT(S): 100 INJECTION, SOLUTION SUBCUTANEOUS at 18:18

## 2020-03-28 RX ADMIN — PIPERACILLIN AND TAZOBACTAM 25 GRAM(S): 4; .5 INJECTION, POWDER, LYOPHILIZED, FOR SOLUTION INTRAVENOUS at 22:00

## 2020-03-28 RX ADMIN — INSULIN HUMAN 4 UNIT(S): 100 INJECTION, SOLUTION SUBCUTANEOUS at 19:40

## 2020-03-28 RX ADMIN — PIPERACILLIN AND TAZOBACTAM 200 GRAM(S): 4; .5 INJECTION, POWDER, LYOPHILIZED, FOR SOLUTION INTRAVENOUS at 18:38

## 2020-03-28 RX ADMIN — Medication 250 MILLIGRAM(S): at 18:08

## 2020-03-28 RX ADMIN — SODIUM CHLORIDE 1000 MILLILITER(S): 9 INJECTION INTRAMUSCULAR; INTRAVENOUS; SUBCUTANEOUS at 12:53

## 2020-03-28 RX ADMIN — INSULIN GLARGINE 25 UNIT(S): 100 INJECTION, SOLUTION SUBCUTANEOUS at 22:25

## 2020-03-28 RX ADMIN — SODIUM CHLORIDE 70 MILLILITER(S): 9 INJECTION INTRAMUSCULAR; INTRAVENOUS; SUBCUTANEOUS at 19:40

## 2020-03-28 NOTE — H&P ADULT - NSHPLABSRESULTS_GEN_ALL_CORE
11.7   31.83 )-----------( 199      ( 28 Mar 2020 12:07 )             36.9     03-28    136  |  106  |  99<H>  ----------------------------<  482<HH>  4.5   |  19<L>  |  1.86<H>    Ca    9.0      28 Mar 2020 12:07    TPro  6.0  /  Alb  1.9<L>  /  TBili  0.6  /  DBili  x   /  AST  33  /  ALT  55  /  AlkPhos  72  03-28

## 2020-03-28 NOTE — ED PROVIDER NOTE - PHYSICAL EXAMINATION
Gen: no acute distress  Cardiac: regular rate and rhythm  Pulm: Clear to auscultation bilaterally  Abd: soft, nontender, nondistended, no guarding  Neuro: awake, alert, oriented x 1

## 2020-03-29 VITALS — DIASTOLIC BLOOD PRESSURE: 69 MMHG | TEMPERATURE: 98 F | SYSTOLIC BLOOD PRESSURE: 107 MMHG | HEART RATE: 71 BPM

## 2020-03-29 LAB
GLUCOSE BLDC GLUCOMTR-MCNC: 318 MG/DL — HIGH (ref 70–99)
GRAM STN FLD: SIGNIFICANT CHANGE UP
SPECIMEN SOURCE: SIGNIFICANT CHANGE UP

## 2020-03-29 RX ADMIN — SODIUM CHLORIDE 70 MILLILITER(S): 9 INJECTION INTRAMUSCULAR; INTRAVENOUS; SUBCUTANEOUS at 06:05

## 2020-03-29 RX ADMIN — Medication 8: at 08:54

## 2020-03-29 RX ADMIN — PIPERACILLIN AND TAZOBACTAM 25 GRAM(S): 4; .5 INJECTION, POWDER, LYOPHILIZED, FOR SOLUTION INTRAVENOUS at 06:05

## 2020-03-29 NOTE — ED ADULT NURSE REASSESSMENT NOTE - NS ED NURSE REASSESS COMMENT FT1
pt a&O x1, awake, report received form outgoing rn, outstanding orders. finger stick taken insulin given see mar

## 2020-03-30 RX ORDER — PEN NEEDLE, DIABETIC 29 G X1/2"
31G X 5 MM NEEDLE, DISPOSABLE MISCELLANEOUS
Qty: 180 | Refills: 0 | Status: ACTIVE | COMMUNITY
Start: 2020-03-25

## 2020-03-30 RX ORDER — METFORMIN HYDROCHLORIDE 850 MG/1
850 TABLET, COATED ORAL
Qty: 180 | Refills: 0 | Status: ACTIVE | COMMUNITY
Start: 2020-03-16

## 2020-03-31 LAB
-  AMIKACIN: SIGNIFICANT CHANGE UP
-  AMPICILLIN/SULBACTAM: SIGNIFICANT CHANGE UP
-  AMPICILLIN: SIGNIFICANT CHANGE UP
-  AZTREONAM: SIGNIFICANT CHANGE UP
-  CEFAZOLIN: SIGNIFICANT CHANGE UP
-  CEFEPIME: SIGNIFICANT CHANGE UP
-  CEFOXITIN: SIGNIFICANT CHANGE UP
-  CEFTRIAXONE: SIGNIFICANT CHANGE UP
-  CIPROFLOXACIN: SIGNIFICANT CHANGE UP
-  ERTAPENEM: SIGNIFICANT CHANGE UP
-  GENTAMICIN: SIGNIFICANT CHANGE UP
-  IMIPENEM: SIGNIFICANT CHANGE UP
-  LEVOFLOXACIN: SIGNIFICANT CHANGE UP
-  MEROPENEM: SIGNIFICANT CHANGE UP
-  PIPERACILLIN/TAZOBACTAM: SIGNIFICANT CHANGE UP
-  TOBRAMYCIN: SIGNIFICANT CHANGE UP
-  TRIMETHOPRIM/SULFAMETHOXAZOLE: SIGNIFICANT CHANGE UP
CULTURE RESULTS: SIGNIFICANT CHANGE UP
GRAM STN FLD: SIGNIFICANT CHANGE UP
METHOD TYPE: SIGNIFICANT CHANGE UP
ORGANISM # SPEC MICROSCOPIC CNT: SIGNIFICANT CHANGE UP
ORGANISM # SPEC MICROSCOPIC CNT: SIGNIFICANT CHANGE UP
SPECIMEN SOURCE: SIGNIFICANT CHANGE UP

## 2020-04-01 ENCOUNTER — RESULT REVIEW (OUTPATIENT)
Age: 85
End: 2020-04-01

## 2020-04-01 LAB
-  AMIKACIN: SIGNIFICANT CHANGE UP
-  AMPICILLIN/SULBACTAM: SIGNIFICANT CHANGE UP
-  AMPICILLIN: SIGNIFICANT CHANGE UP
-  AZTREONAM: SIGNIFICANT CHANGE UP
-  CEFAZOLIN: SIGNIFICANT CHANGE UP
-  CEFEPIME: SIGNIFICANT CHANGE UP
-  CEFOXITIN: SIGNIFICANT CHANGE UP
-  CEFTRIAXONE: SIGNIFICANT CHANGE UP
-  CIPROFLOXACIN: SIGNIFICANT CHANGE UP
-  ERTAPENEM: SIGNIFICANT CHANGE UP
-  GENTAMICIN: SIGNIFICANT CHANGE UP
-  LEVOFLOXACIN: SIGNIFICANT CHANGE UP
-  MEROPENEM: SIGNIFICANT CHANGE UP
-  PIPERACILLIN/TAZOBACTAM: SIGNIFICANT CHANGE UP
-  TOBRAMYCIN: SIGNIFICANT CHANGE UP
-  TRIMETHOPRIM/SULFAMETHOXAZOLE: SIGNIFICANT CHANGE UP
METHOD TYPE: SIGNIFICANT CHANGE UP

## 2020-04-02 LAB
-  AMPICILLIN: SIGNIFICANT CHANGE UP
-  GENTAMICIN SYNERGY: SIGNIFICANT CHANGE UP
-  VANCOMYCIN: SIGNIFICANT CHANGE UP
CULTURE RESULTS: SIGNIFICANT CHANGE UP
GRAM STN FLD: SIGNIFICANT CHANGE UP
METHOD TYPE: SIGNIFICANT CHANGE UP
ORGANISM # SPEC MICROSCOPIC CNT: SIGNIFICANT CHANGE UP
SPECIMEN SOURCE: SIGNIFICANT CHANGE UP

## 2020-04-07 DIAGNOSIS — I10 ESSENTIAL (PRIMARY) HYPERTENSION: ICD-10-CM

## 2020-04-07 DIAGNOSIS — E78.5 HYPERLIPIDEMIA, UNSPECIFIED: ICD-10-CM

## 2020-04-07 DIAGNOSIS — M48.061 SPINAL STENOSIS, LUMBAR REGION WITHOUT NEUROGENIC CLAUDICATION: ICD-10-CM

## 2020-04-07 DIAGNOSIS — A41.9 SEPSIS, UNSPECIFIED ORGANISM: ICD-10-CM

## 2020-04-07 DIAGNOSIS — H40.9 UNSPECIFIED GLAUCOMA: ICD-10-CM

## 2020-04-07 DIAGNOSIS — E11.51 TYPE 2 DIABETES MELLITUS WITH DIABETIC PERIPHERAL ANGIOPATHY WITHOUT GANGRENE: ICD-10-CM

## 2020-04-07 DIAGNOSIS — E11.10 TYPE 2 DIABETES MELLITUS WITH KETOACIDOSIS WITHOUT COMA: ICD-10-CM

## 2020-05-05 ENCOUNTER — APPOINTMENT (OUTPATIENT)
Dept: WOUND CARE | Facility: CLINIC | Age: 85
End: 2020-05-05

## 2020-05-11 PROBLEM — L98.429 STAGE 3 SKIN ULCER OF SACRAL REGION: Status: ACTIVE | Noted: 2019-09-10

## 2021-06-28 NOTE — PROGRESS NOTE ADULT - ATTENDING COMMENTS
Chief Complaint   Patient presents with     Temporary Loss Of Vision     Loss Of Vision   HPI    Temporary Loss Of Vision      Laterality:  left eye     Onset:  sudden     Onset:  3 months ago     Quality:  missing vision     Severity:  complete loss of vision     Frequency:  intermittently     Associated symptoms:  headache     Treatments tried:  no treatments   Comments      Sees a white spot in vision then vision goes completely white and loses vision up to 1.5 hours. This has happened to patient 5 times in the last 3 months. Patient has had uncontrolled blood pressure and headaches for 9 months and the last 1 month has been on medication for high blood pressure and blood pressure is regulated now and it still happened 5 nights ago.     Eyes: painless vision loss left eye, multiple episodes  Constitutional: No fevers, chills, or weight changes.      Medical, surgical and family histories reviewed and updated 6/28/2021.       OBJECTIVE: See Ophthalmology exam    ASSESSMENT:    ICD-10-CM    1. Episode of visual loss of left eye  H53.122 CRP inflammation     Erythrocyte sedimentation rate auto      PLAN:     Patient Instructions   Patient reports multiple episodes of complete vision loss in her left eye over the past 3 months.  Ocular health within normal limits today.     Order CRP and ESR lab work to rule out giant cell arteritis.   Patient already scheduled for MRI/MRA of brain on 7/2/21.   Carotid ultrasound unremarkable 6/10/21.     I will be in touch with you regarding your lab work results.   Notify our clinic with any concerns.     Faustino Sharma, NELL  [address] [place]  [address]    [phone]        Family opting to take pt home despite recommendation for GI eval inpt. They understand risk of worsening bleed or death. Pt is hemodynamically stable, Hb 10-11, no further bleed inpt, will dc pt with PCP follow up within a week. PCP Dr. vera notified.    Spent 45mins on dc Family opting to take pt home despite recommendation for GI eval inpt. They understand risk of worsening bleed or death. Pt is hemodynamically stable, Hb 10-11, no further bleed inpt, will dc pt with PCP follow up within a week. PCP Dr. Biswas notified.    Spent 45mins on dc

## 2022-08-15 NOTE — PATIENT PROFILE ADULT - NSPROMUTINFOINDIVIDFT_GEN_A_NUR
pt likes to pray with Jehovah witness elder
PAST MEDICAL HISTORY:  Depression     PTSD (post-traumatic stress disorder)

## 2023-04-18 NOTE — ED ADULT NURSE NOTE - CAS EDN DISCHARGE INTERVENTIONS
IV intact/Arm band on Paramedian Forehead Flap Text: A decision was made to reconstruct the defect utilizing an interpolation axial flap and a staged reconstruction.  A telfa template was made of the defect.  This telfa template was then used to outline the paramedian forehead pedicle flap.  The donor area for the pedicle flap was then injected with anesthesia.  The flap was excised through the skin and subcutaneous tissue down to the layer of the underlying musculature.  The pedicle flap was carefully excised within this deep plane to maintain its blood supply.  The edges of the donor site were undermined.   The donor site was closed in a primary fashion.  The pedicle was then rotated into position and sutured.  Once the tube was sutured into place, adequate blood supply was confirmed with blanching and refill.  The pedicle was then wrapped with xeroform gauze and dressed appropriately with a telfa and gauze bandage to ensure continued blood supply and protect the attached pedicle.

## 2023-09-08 NOTE — PROGRESS NOTE ADULT - PROBLEM/PLAN-9
86 yo F from home with PMH of CKD, dementia, recent COVID + 8/28 s/p paxlovid, presenting with s/p fall with left ankle pain found to have acute left distal fibula/lateral malleolus fracture    Left acute distal fracture of fibula/lateral malleolus, s/p fall  Gait imbalance  -Ortho consult appreciated, recommended conservative management  -NWB LLE, Elevate LLE   -Pain medication PRN  -PT pj    Recent COVID  -Apparently diagnosed 8/28 by [facility] , completed paxlovid  -HERE, still testing + for COVID; asymptomatic   - Would recommend discussing case in AM with infection control re: isolation precautions - no answer, will try again tomorrow  - To be on safer side, will place on isolation tonight as per hospital protocol to complete ten day course    Hypomag (Resolved)  -Repleted  -Monitor level    HTN  HLD  -Continue bystolic and atorvastatin   -Monitor BP - improved    Hx of Dementia  -Will monitor, not on medications  -B12/folate/TSH/FT4 WNL  -Fall precautions    #Hx of CKD  -Cr 1.4 range in 1/2023, at baseline  -Avoid nephrotoxic agents   -Monitor creatinine    DVT PPx  -Lovenox SC    DISP: From home, pending PT pj    Spoke with Daughter Edelmira at bedside; answered all questions, in agreement with care plan. DISPLAY PLAN FREE TEXT

## 2024-04-06 NOTE — ED ADULT NURSE NOTE - NS ED NOTE ABUSE RESPONSE YN
Yes FAMILY HISTORY:  Sibling  Still living? Unknown  FH: heart disease, Age at diagnosis: Age Unknown

## 2025-01-16 NOTE — HISTORY OF PRESENT ILLNESS
[FreeTextEntry1] : Ms. CARMELLA NAILS   presents to the office with a wound for few months duration.  The wound is located on  the sacral  and buttock .  The patient has complaints of burning,pain, \par    The patient has been dressing the wound with silvadene. \par  The patient denies fevers or chills.  \par The patient has localized pain to the wound upon dressing changes. \par  The patient has no other complaints or associated symptoms.  HbA1c is 6.5\par H/O DM HTN HLD PVD lymphedema, atrial sep anerysm, CKD stage 3, lumbar stenosis, alopecia, candidiasis\par  81 YOF with PMHx of HTN, lymphedema, arthritis, sacral decubitus ulcer s/p debridement one year ago presents with weakness. PE with multiple areas of skin breakdown/ stage 1 ulcer/skin changes to lower back and buttocks, with super imposed candida. Elevated CRP and WBC concerning for infections cause.